# Patient Record
Sex: FEMALE
[De-identification: names, ages, dates, MRNs, and addresses within clinical notes are randomized per-mention and may not be internally consistent; named-entity substitution may affect disease eponyms.]

---

## 2023-03-22 ENCOUNTER — OFFICE VISIT (OUTPATIENT)
Facility: CLINIC | Age: 53
End: 2023-03-22
Payer: COMMERCIAL

## 2023-03-22 VITALS
DIASTOLIC BLOOD PRESSURE: 71 MMHG | HEART RATE: 77 BPM | TEMPERATURE: 98 F | BODY MASS INDEX: 33.49 KG/M2 | HEIGHT: 65 IN | RESPIRATION RATE: 18 BRPM | OXYGEN SATURATION: 97 % | WEIGHT: 201 LBS | SYSTOLIC BLOOD PRESSURE: 111 MMHG

## 2023-03-22 DIAGNOSIS — E21.3 HYPERPARATHYROIDISM (HCC): ICD-10-CM

## 2023-03-22 DIAGNOSIS — Z11.59 ENCOUNTER FOR HCV SCREENING TEST FOR LOW RISK PATIENT: Primary | ICD-10-CM

## 2023-03-22 DIAGNOSIS — Z13.29 SCREENING FOR THYROID DISORDER: ICD-10-CM

## 2023-03-22 PROCEDURE — 99203 OFFICE O/P NEW LOW 30 MIN: CPT | Performed by: FAMILY MEDICINE

## 2023-03-22 RX ORDER — METHYLPHENIDATE HYDROCHLORIDE 36 MG/1
1 TABLET, EXTENDED RELEASE ORAL DAILY
COMMUNITY
Start: 2016-12-23

## 2023-03-22 RX ORDER — FLUOXETINE 20 MG/1
40 TABLET, FILM COATED ORAL DAILY
COMMUNITY
Start: 2023-02-13

## 2023-03-22 RX ORDER — FEXOFENADINE HCL 180 MG/1
TABLET ORAL
COMMUNITY
Start: 2019-10-14

## 2023-03-22 SDOH — ECONOMIC STABILITY: FOOD INSECURITY: WITHIN THE PAST 12 MONTHS, THE FOOD YOU BOUGHT JUST DIDN'T LAST AND YOU DIDN'T HAVE MONEY TO GET MORE.: NEVER TRUE

## 2023-03-22 SDOH — ECONOMIC STABILITY: HOUSING INSECURITY
IN THE LAST 12 MONTHS, WAS THERE A TIME WHEN YOU DID NOT HAVE A STEADY PLACE TO SLEEP OR SLEPT IN A SHELTER (INCLUDING NOW)?: NO

## 2023-03-22 SDOH — ECONOMIC STABILITY: FOOD INSECURITY: WITHIN THE PAST 12 MONTHS, YOU WORRIED THAT YOUR FOOD WOULD RUN OUT BEFORE YOU GOT MONEY TO BUY MORE.: NEVER TRUE

## 2023-03-22 SDOH — ECONOMIC STABILITY: INCOME INSECURITY: HOW HARD IS IT FOR YOU TO PAY FOR THE VERY BASICS LIKE FOOD, HOUSING, MEDICAL CARE, AND HEATING?: NOT HARD AT ALL

## 2023-03-22 ASSESSMENT — PATIENT HEALTH QUESTIONNAIRE - PHQ9
SUM OF ALL RESPONSES TO PHQ QUESTIONS 1-9: 2
2. FEELING DOWN, DEPRESSED OR HOPELESS: 1
1. LITTLE INTEREST OR PLEASURE IN DOING THINGS: 1
SUM OF ALL RESPONSES TO PHQ9 QUESTIONS 1 & 2: 2

## 2023-03-22 NOTE — PROGRESS NOTES
Requested:   1     or  #1. PMS  Stable on fluoxetine    2. ADHD  Stable on methylphenidate    3. Osteopenia  Stable on Boniva    4. Hyperparathyroid   Stable. Follow-up with endocrinology. New referral to endocrinology. 5.  Alpha 1 antitrypsin-  Follow-up with lung and liver specialist in [address] [place] [address]. Up-to-date with labs and Pap, colonoscopy and mammogram.    No follow-up provider specified.       Jarrett Culver MD

## 2023-04-27 ENCOUNTER — TELEPHONE (OUTPATIENT)
Age: 53
End: 2023-04-27

## 2023-04-27 NOTE — TELEPHONE ENCOUNTER
----- Message from Orlandojamshid Atkins sent at 4/27/2023  8:59 AM EDT -----  Subject: Appointment Request    Reason for Call: New Patient/New to Provider Appointment needed: New   Patient Request Appointment    QUESTIONS    Reason for appointment request? No appointments available during search     Additional Information for Provider? Patient is calling in and she was   told by Dr. Chai Dang that they would take her and her son on as a new   patient. They would like to have a morning appointment if possible on any   day.  Thank you.   ---------------------------------------------------------------------------  --------------  [id number]  [phone]; OK to leave message on voicemail  ---------------------------------------------------------------------------  --------------  SCRIPT ANSWERS  SALVADORID Screen: Lesley Joseph

## 2023-06-14 ENCOUNTER — TELEPHONE (OUTPATIENT)
Facility: CLINIC | Age: 53
End: 2023-06-14

## 2023-06-26 PROBLEM — N20.0 NEPHROLITHIASIS: Status: ACTIVE | Noted: 2023-06-26

## 2023-06-26 PROBLEM — K76.0 NAFLD (NONALCOHOLIC FATTY LIVER DISEASE): Status: ACTIVE | Noted: 2023-06-26

## 2023-06-26 PROBLEM — Z14.8 ALPHA-1-ANTITRYPSIN DEFICIENCY CARRIER: Status: ACTIVE | Noted: 2023-06-26

## 2023-06-26 PROBLEM — F90.9 ADHD (ATTENTION DEFICIT HYPERACTIVITY DISORDER): Status: ACTIVE | Noted: 2023-06-26

## 2023-06-26 PROBLEM — E21.0 PRIMARY HYPERPARATHYROIDISM (HCC): Status: ACTIVE | Noted: 2023-06-26

## 2023-06-26 PROBLEM — M85.80 OSTEOPENIA: Status: RESOLVED | Noted: 2023-06-26 | Resolved: 2023-06-26

## 2023-06-26 PROBLEM — M85.80 OSTEOPENIA: Status: ACTIVE | Noted: 2023-06-26

## 2023-06-26 PROBLEM — E21.3 HYPERPARATHYROIDISM (HCC): Status: ACTIVE | Noted: 2023-06-26

## 2023-06-26 PROBLEM — R74.01 ELEVATED AST (SGOT): Status: ACTIVE | Noted: 2023-06-26

## 2023-06-26 PROBLEM — E03.9 HYPOTHYROIDISM: Status: ACTIVE | Noted: 2023-06-26

## 2023-06-26 PROBLEM — I49.3 FREQUENT PVCS: Status: ACTIVE | Noted: 2023-06-26

## 2023-06-26 PROBLEM — Z90.89 S/P SUBTOTAL PARATHYROIDECTOMY: Status: ACTIVE | Noted: 2023-06-26

## 2023-06-26 PROBLEM — E89.2 S/P SUBTOTAL PARATHYROIDECTOMY (HCC): Status: ACTIVE | Noted: 2023-06-26

## 2023-06-26 PROBLEM — Z98.890 S/P SUBTOTAL PARATHYROIDECTOMY: Status: ACTIVE | Noted: 2023-06-26

## 2023-06-26 PROBLEM — H90.3 SENSORINEURAL HEARING LOSS (SNHL) OF BOTH EARS: Status: ACTIVE | Noted: 2023-06-26

## 2023-06-27 ENCOUNTER — OFFICE VISIT (OUTPATIENT)
Age: 53
End: 2023-06-27
Payer: COMMERCIAL

## 2023-06-27 VITALS
BODY MASS INDEX: 34.82 KG/M2 | HEART RATE: 87 BPM | TEMPERATURE: 98.6 F | DIASTOLIC BLOOD PRESSURE: 70 MMHG | WEIGHT: 209 LBS | SYSTOLIC BLOOD PRESSURE: 106 MMHG | OXYGEN SATURATION: 97 % | HEIGHT: 65 IN | RESPIRATION RATE: 16 BRPM

## 2023-06-27 DIAGNOSIS — M79.631 RIGHT FOREARM PAIN: ICD-10-CM

## 2023-06-27 DIAGNOSIS — E66.09 CLASS 1 OBESITY DUE TO EXCESS CALORIES WITH SERIOUS COMORBIDITY AND BODY MASS INDEX (BMI) OF 34.0 TO 34.9 IN ADULT: ICD-10-CM

## 2023-06-27 DIAGNOSIS — R13.10 DYSPHAGIA, UNSPECIFIED TYPE: ICD-10-CM

## 2023-06-27 DIAGNOSIS — E21.0 PRIMARY HYPERPARATHYROIDISM (HCC): ICD-10-CM

## 2023-06-27 DIAGNOSIS — H81.10 BENIGN PAROXYSMAL POSITIONAL VERTIGO, UNSPECIFIED LATERALITY: ICD-10-CM

## 2023-06-27 DIAGNOSIS — E88.01 HETEROZYGOUS ALPHA 1-ANTITRYPSIN DEFICIENCY (HCC): Primary | ICD-10-CM

## 2023-06-27 DIAGNOSIS — F90.9 ATTENTION DEFICIT HYPERACTIVITY DISORDER (ADHD), UNSPECIFIED ADHD TYPE: ICD-10-CM

## 2023-06-27 DIAGNOSIS — M85.80 OSTEOPENIA, UNSPECIFIED LOCATION: ICD-10-CM

## 2023-06-27 DIAGNOSIS — E04.1 LEFT THYROID NODULE: ICD-10-CM

## 2023-06-27 DIAGNOSIS — Z12.83 SCREENING EXAM FOR SKIN CANCER: ICD-10-CM

## 2023-06-27 DIAGNOSIS — K76.0 NAFLD (NONALCOHOLIC FATTY LIVER DISEASE): ICD-10-CM

## 2023-06-27 DIAGNOSIS — Z11.59 NEED FOR HEPATITIS C SCREENING TEST: ICD-10-CM

## 2023-06-27 DIAGNOSIS — E89.2 S/P SUBTOTAL PARATHYROIDECTOMY (HCC): ICD-10-CM

## 2023-06-27 DIAGNOSIS — Z00.00 LABORATORY TESTS ORDERED AS PART OF A COMPLETE PHYSICAL EXAM (CPE): ICD-10-CM

## 2023-06-27 DIAGNOSIS — Z86.018 HISTORY OF LIPOMA: ICD-10-CM

## 2023-06-27 PROBLEM — E66.811 CLASS 1 OBESITY DUE TO EXCESS CALORIES IN ADULT: Status: ACTIVE | Noted: 2023-06-27

## 2023-06-27 PROCEDURE — 99215 OFFICE O/P EST HI 40 MIN: CPT | Performed by: INTERNAL MEDICINE

## 2023-06-27 RX ORDER — IBANDRONATE SODIUM 150 MG/1
TABLET, FILM COATED ORAL
COMMUNITY
Start: 2023-04-07

## 2023-06-27 SDOH — ECONOMIC STABILITY: FOOD INSECURITY: WITHIN THE PAST 12 MONTHS, YOU WORRIED THAT YOUR FOOD WOULD RUN OUT BEFORE YOU GOT MONEY TO BUY MORE.: NEVER TRUE

## 2023-06-27 SDOH — HEALTH STABILITY: PHYSICAL HEALTH: ON AVERAGE, HOW MANY MINUTES DO YOU ENGAGE IN EXERCISE AT THIS LEVEL?: 40 MIN

## 2023-06-27 SDOH — HEALTH STABILITY: PHYSICAL HEALTH: ON AVERAGE, HOW MANY DAYS PER WEEK DO YOU ENGAGE IN MODERATE TO STRENUOUS EXERCISE (LIKE A BRISK WALK)?: 3 DAYS

## 2023-06-27 SDOH — ECONOMIC STABILITY: FOOD INSECURITY: WITHIN THE PAST 12 MONTHS, THE FOOD YOU BOUGHT JUST DIDN'T LAST AND YOU DIDN'T HAVE MONEY TO GET MORE.: NEVER TRUE

## 2023-06-27 SDOH — ECONOMIC STABILITY: INCOME INSECURITY: HOW HARD IS IT FOR YOU TO PAY FOR THE VERY BASICS LIKE FOOD, HOUSING, MEDICAL CARE, AND HEATING?: NOT HARD AT ALL

## 2023-06-27 ASSESSMENT — PATIENT HEALTH QUESTIONNAIRE - PHQ9
SUM OF ALL RESPONSES TO PHQ QUESTIONS 1-9: 0
1. LITTLE INTEREST OR PLEASURE IN DOING THINGS: 0
SUM OF ALL RESPONSES TO PHQ9 QUESTIONS 1 & 2: 0
SUM OF ALL RESPONSES TO PHQ QUESTIONS 1-9: 0
2. FEELING DOWN, DEPRESSED OR HOPELESS: 0

## 2023-06-30 PROBLEM — J30.2 SEASONAL ALLERGIC RHINITIS: Status: ACTIVE | Noted: 2023-06-30

## 2023-07-02 PROBLEM — R13.10 DYSPHAGIA: Status: ACTIVE | Noted: 2023-07-02

## 2023-07-02 PROBLEM — Z86.018 HISTORY OF LIPOMA: Status: ACTIVE | Noted: 2023-07-02

## 2023-07-02 PROBLEM — E88.01 HETEROZYGOUS ALPHA 1-ANTITRYPSIN DEFICIENCY (HCC): Status: ACTIVE | Noted: 2023-06-26

## 2023-07-02 PROBLEM — H81.10 BENIGN PAROXYSMAL POSITIONAL VERTIGO: Status: ACTIVE | Noted: 2023-07-02

## 2023-07-02 PROBLEM — E04.1 LEFT THYROID NODULE: Status: ACTIVE | Noted: 2023-07-02

## 2023-07-05 ENCOUNTER — PATIENT MESSAGE (OUTPATIENT)
Age: 53
End: 2023-07-05

## 2023-07-06 ENCOUNTER — OFFICE VISIT (OUTPATIENT)
Age: 53
End: 2023-07-06
Payer: COMMERCIAL

## 2023-07-06 VITALS
HEIGHT: 65 IN | OXYGEN SATURATION: 97 % | DIASTOLIC BLOOD PRESSURE: 64 MMHG | BODY MASS INDEX: 33.99 KG/M2 | SYSTOLIC BLOOD PRESSURE: 94 MMHG | HEART RATE: 77 BPM | WEIGHT: 204 LBS

## 2023-07-06 DIAGNOSIS — M79.89 SOFT TISSUE MASS: Primary | ICD-10-CM

## 2023-07-06 DIAGNOSIS — R22.31 FOREARM MASS, RIGHT: ICD-10-CM

## 2023-07-06 PROCEDURE — 99204 OFFICE O/P NEW MOD 45 MIN: CPT | Performed by: SURGERY

## 2023-07-06 NOTE — PROGRESS NOTES
General Surgery Consult    Yani Manrique  Admit date: (Not on file)    MRN: [id number]     : 1970     Age: 47 y.o. Attending Physician: Must[facility]      History of Present Illness:      Yani Manrique is a 47 y.o. female who was referred to me by Dr. Isac Riley for evaluation of right forearm mass. The patient stated that this mass has been present for years and she had it excised more than 10 years ago and it did recur. It is causing some discomfort but not to pain. She also had multiple surgeries on the right forearm for lipoma and 1 for wrist fracture. She stated that the pain radiates to the upper arm and the lower arm sometimes and if she feels that it is hitting a nerve. She stated that the surgeon who excised it the first time told her that he did not take it completely because it was abutting the nerve and he was concerned about injuring the nerve and he told her that it may recur.     Patient Active Problem List    Diagnosis Date Noted    Dysphagia 2023    History of lipoma 2023    Left thyroid nodule 2023    Benign paroxysmal positional vertigo 2023    Seasonal allergic rhinitis 2023    Class 1 obesity due to excess calories in adult 2023    Heterozygous alpha 1-antitrypsin deficiency ([address]) 2023    Osteopenia 2023    Primary hyperparathyroidism ([address]) 2023    ADHD (attention deficit hyperactivity disorder) 2023    S/P subtotal parathyroidectomy ([address]) 2023    Nephrolithiasis 2023    Frequent PVCs 2023    Sensorineural hearing loss (SNHL) of both ears 2023    Elevated AST (SGOT) 2023    NAFLD (nonalcoholic fatty liver disease) 2023     Past Medical History:   Diagnosis Date    ADHD (attention deficit hyperactivity disorder)     Depression     Frequent PVCs 2023    Heterozygous alpha 1-antitrypsin deficiency ([address]) 2023    History of abnormal cervical Pap

## 2023-07-06 NOTE — PROGRESS NOTES
Candace Napoles is a 47 y.o. female (: 1970) presenting to address:    Chief Complaint   Patient presents with    New Patient     Discomfort of right forearm/referred by Dr. Elin Spatz       Medication list and allergies have been reviewed with Candace Napoles and updated as of today's date. I have gone over all Medical, Surgical and Social History with Candace Napoles and updated/added the information accordingly.

## 2023-07-11 RX ORDER — EFLORNITHINE HYDROCHLORIDE 139 MG/G
CREAM TOPICAL
Qty: 45 G | Refills: 3 | Status: SHIPPED | OUTPATIENT
Start: 2023-07-11

## 2023-07-11 NOTE — TELEPHONE ENCOUNTER
The mail order pharmacy on file is correct. I remember adding this for the patient when she came in for her visit. She specified she wanted all Rxs to go there.

## 2023-09-06 ENCOUNTER — OFFICE VISIT (OUTPATIENT)
Age: 53
End: 2023-09-06
Payer: COMMERCIAL

## 2023-09-06 VITALS
RESPIRATION RATE: 18 BRPM | WEIGHT: 211 LBS | TEMPERATURE: 97.9 F | SYSTOLIC BLOOD PRESSURE: 116 MMHG | DIASTOLIC BLOOD PRESSURE: 81 MMHG | HEIGHT: 65 IN | OXYGEN SATURATION: 99 % | BODY MASS INDEX: 35.16 KG/M2 | HEART RATE: 89 BPM

## 2023-09-06 DIAGNOSIS — E66.09 CLASS 1 OBESITY DUE TO EXCESS CALORIES WITHOUT SERIOUS COMORBIDITY WITH BODY MASS INDEX (BMI) OF 34.0 TO 34.9 IN ADULT: Primary | ICD-10-CM

## 2023-09-06 DIAGNOSIS — Z71.3 ENCOUNTER FOR DIETARY CONSULTATION: ICD-10-CM

## 2023-09-06 DIAGNOSIS — Z71.3 WEIGHT LOSS COUNSELING, ENCOUNTER FOR: ICD-10-CM

## 2023-09-06 PROCEDURE — 99203 OFFICE O/P NEW LOW 30 MIN: CPT | Performed by: NURSE PRACTITIONER

## 2023-09-06 ASSESSMENT — ENCOUNTER SYMPTOMS
GASTROINTESTINAL NEGATIVE: 1
RESPIRATORY NEGATIVE: 1
ALLERGIC/IMMUNOLOGIC NEGATIVE: 1
EYES NEGATIVE: 1

## 2023-09-06 NOTE — PROGRESS NOTES
Weight Loss Progress Note: Initial Physician Visit      Christin Gómez is a 47 y.o. female with BMI 35.1 who is here for her initial evaluation for the medical weight loss LCD program. Patient has worked with a nutritionist in the past and managed a 20lbs weight loss, but stopped the program. She wants to lose weigh to improve her health. CC: Obesity    Weight History  Current weight 211lb   Goal weight 150lbs  Highest weight 211lbs   (See weight gain time line scanned into media section of chart)    Food intolerances (gas, bloating, diarrhea, vomiting)? Raw vegetables. Weight loss History  How many weight loss attempts have you had? At least one serious attempt in 2020. Which program were you most successful doing? Patient went through a nutritional program covered by her job - nutrition counseling with her PCP and lost 30lbs. Significant Medical History    Have you ever taken appetite suppressants? No.  If yes: Rx or OTC? If yes; Any negative side effects? Ever diagnosed with sleep apnea or put on CPAP? No.    Ever had bariatric surgery?: No.    Pregnant or planning on becoming pregnant w/in 6 months: No.      Significant Psychosocial History   Any history of drug abuse or dependence: No.  Current Major Lifestyle Changes: NAFLD, Depression. Any potential unsupportive people: No.  Why are you starting a weight loss program now? Patient has some health conditions that she would like improve through weight loss. Are you ready? Yes. History of binge eating disorder or anorexia: No.  If yes, are you currently being treated? Social History  Social History     Tobacco Use    Smoking status: Never    Smokeless tobacco: Never   Substance Use Topics    Alcohol use: Not Currently     Comment: rarely     How many times a week do you eat out? 3 times weekly    Do you drink any EtOH? No.   If so, how much? Exercise  How many days a week do you currently exercise?   Exercising

## 2023-10-05 ENCOUNTER — PATIENT MESSAGE (OUTPATIENT)
Age: 53
End: 2023-10-05

## 2023-10-06 ENCOUNTER — OFFICE VISIT (OUTPATIENT)
Age: 53
End: 2023-10-06
Payer: COMMERCIAL

## 2023-10-06 VITALS
HEART RATE: 83 BPM | BODY MASS INDEX: 33.49 KG/M2 | OXYGEN SATURATION: 98 % | TEMPERATURE: 97.8 F | DIASTOLIC BLOOD PRESSURE: 77 MMHG | RESPIRATION RATE: 17 BRPM | HEIGHT: 65 IN | SYSTOLIC BLOOD PRESSURE: 118 MMHG | WEIGHT: 201 LBS

## 2023-10-06 DIAGNOSIS — Z71.3 WEIGHT LOSS COUNSELING, ENCOUNTER FOR: ICD-10-CM

## 2023-10-06 DIAGNOSIS — Z71.3 ENCOUNTER FOR DIETARY CONSULTATION: ICD-10-CM

## 2023-10-06 DIAGNOSIS — E66.09 CLASS 1 OBESITY DUE TO EXCESS CALORIES WITHOUT SERIOUS COMORBIDITY WITH BODY MASS INDEX (BMI) OF 33.0 TO 33.9 IN ADULT: Primary | ICD-10-CM

## 2023-10-06 PROCEDURE — 99213 OFFICE O/P EST LOW 20 MIN: CPT | Performed by: NURSE PRACTITIONER

## 2023-10-06 ASSESSMENT — ENCOUNTER SYMPTOMS
EYES NEGATIVE: 1
RESPIRATORY NEGATIVE: 1
GASTROINTESTINAL NEGATIVE: 1
ALLERGIC/IMMUNOLOGIC NEGATIVE: 1

## 2023-10-27 ENCOUNTER — PATIENT MESSAGE (OUTPATIENT)
Age: 53
End: 2023-10-27

## 2023-10-30 NOTE — TELEPHONE ENCOUNTER
Called and spoke with patient. Reports does not feel that she still has a sinus infection but still complaining increased congestion and transient dizziness when bending over to pick something up off the floor. Not persistent and positional only. Continues on Allegra daily. Advised that likely related to increased allergies triggering vertigo given change in weather. Instructed to restart Flonase 2 sprays each nostril at bedtime given morning symptoms. Advised to call back if not improving.   Answered all questions

## 2023-11-02 ENCOUNTER — PATIENT MESSAGE (OUTPATIENT)
Age: 53
End: 2023-11-02

## 2023-11-08 ENCOUNTER — OFFICE VISIT (OUTPATIENT)
Age: 53
End: 2023-11-08

## 2023-11-08 VITALS — BODY MASS INDEX: 32.62 KG/M2 | WEIGHT: 196 LBS

## 2023-11-08 DIAGNOSIS — G56.10: Primary | ICD-10-CM

## 2023-11-08 DIAGNOSIS — M25.532 BILATERAL WRIST PAIN: ICD-10-CM

## 2023-11-08 DIAGNOSIS — M25.531 BILATERAL WRIST PAIN: ICD-10-CM

## 2023-11-08 NOTE — PROGRESS NOTES
median nerve, unspecified laterality  NCV WITH EMG BILATERAL UPPER EXTREMITIES      2. Bilateral wrist pain  [57679] Wrist 3V    [79973] Wrist 3V            Plan:     Bilateral upper extremity EMG to evaluate for possible median motor neuropathy given thenar weakness as well as burning pain in thenar musculature. Return for EMG Review. Plan was reviewed with patient, who verbalized agreement and understanding of the plan    Note: This note was completed using voice recognition software.   Any typographical/name errors or mistakes are unintentional.

## 2023-11-27 DIAGNOSIS — F90.9 ATTENTION DEFICIT HYPERACTIVITY DISORDER (ADHD), UNSPECIFIED ADHD TYPE: ICD-10-CM

## 2023-11-28 RX ORDER — METHYLPHENIDATE HYDROCHLORIDE 30 MG/1
30 CAPSULE, EXTENDED RELEASE ORAL EVERY MORNING
Qty: 90 CAPSULE | Refills: 0 | Status: SHIPPED | OUTPATIENT
Start: 2023-11-28 | End: 2024-02-26

## 2023-11-28 NOTE — TELEPHONE ENCOUNTER
PCP: Brianna Mcmillan MD    LAST REFILL PER CHART:  methylphenidate (METADATE CD) 30 MG extended release capsule ()         Summary: Take 1 capsule by mouth every morning for 90 days. Max Daily Amount: 30 mg, Disp-90 capsule, R-0  Dose, Frequency: 30 mg, EVERY MORNING  Start: 8/15/2023  Pharmacy: [address] [place] [address], [address] [place] [address] [phone]  Med Dose History       Patient Sig: Take 1 capsule by mouth every morning for 90 days.  Max Daily Amount: 30 mg       Ordered on: 8/15/2023       Authorized by: Jojo Elliottdin capsule       Refills: 0 ordered          Future Appointments   Date Time Provider [address] [place] [address]   2023  9:30 AM [facility] LAB VISIT [facility] BS AMB   2024  9:00 AM Sunita Arnold MD [facility] BS AMB

## 2023-11-29 NOTE — TELEPHONE ENCOUNTER
Reviewed report generated by the [facility]. Does not demonstrate aberrancies or inconsistencies with regard to the prescribing of controlled medications to this patient by other providers. Last filled 8/16/2023 per . Last UDS not done . Will obtain at next lab appointment. Order already placed. Will need to sign an updated controlled substance agreement at next visit.

## 2023-12-06 ENCOUNTER — PATIENT MESSAGE (OUTPATIENT)
Age: 53
End: 2023-12-06

## 2023-12-07 NOTE — TELEPHONE ENCOUNTER
Called and spoke with patient. Discussed symptoms as outlined and reports drainage is generally clear. Reports not coughing significantly, and experiencing more hoarseness and postnasal drainage. Advised that symptoms were due to viral infection and recommended continuing with symptomatic treatment. Confirmed that she is also using Flonase. Advised to call for any worsening of symptoms.

## 2024-01-02 ENCOUNTER — OFFICE VISIT (OUTPATIENT)
Age: 54
End: 2024-01-02
Payer: COMMERCIAL

## 2024-01-02 VITALS
RESPIRATION RATE: 16 BRPM | WEIGHT: 207 LBS | SYSTOLIC BLOOD PRESSURE: 118 MMHG | OXYGEN SATURATION: 96 % | BODY MASS INDEX: 34.49 KG/M2 | HEART RATE: 64 BPM | TEMPERATURE: 98.6 F | DIASTOLIC BLOOD PRESSURE: 68 MMHG | HEIGHT: 65 IN

## 2024-01-02 DIAGNOSIS — Z00.00 LABORATORY TESTS ORDERED AS PART OF A COMPLETE PHYSICAL EXAM (CPE): ICD-10-CM

## 2024-01-02 DIAGNOSIS — J30.89 NON-SEASONAL ALLERGIC RHINITIS, UNSPECIFIED TRIGGER: ICD-10-CM

## 2024-01-02 DIAGNOSIS — G89.29 CHRONIC TMJ PAIN: ICD-10-CM

## 2024-01-02 DIAGNOSIS — R74.01 ELEVATED LIVER TRANSAMINASE LEVEL: ICD-10-CM

## 2024-01-02 DIAGNOSIS — E04.1 LEFT THYROID NODULE: ICD-10-CM

## 2024-01-02 DIAGNOSIS — H90.3 SENSORINEURAL HEARING LOSS (SNHL) OF BOTH EARS: ICD-10-CM

## 2024-01-02 DIAGNOSIS — E88.01 HETEROZYGOUS ALPHA 1-ANTITRYPSIN DEFICIENCY (HCC): ICD-10-CM

## 2024-01-02 DIAGNOSIS — E89.2 S/P SUBTOTAL PARATHYROIDECTOMY (HCC): ICD-10-CM

## 2024-01-02 DIAGNOSIS — M85.89 OSTEOPENIA OF MULTIPLE SITES: ICD-10-CM

## 2024-01-02 DIAGNOSIS — F90.9 ATTENTION DEFICIT HYPERACTIVITY DISORDER (ADHD), UNSPECIFIED ADHD TYPE: ICD-10-CM

## 2024-01-02 DIAGNOSIS — Z97.4 WEARS HEARING AID IN BOTH EARS: ICD-10-CM

## 2024-01-02 DIAGNOSIS — H81.10 BENIGN PAROXYSMAL POSITIONAL VERTIGO, UNSPECIFIED LATERALITY: ICD-10-CM

## 2024-01-02 DIAGNOSIS — E66.09 CLASS 1 OBESITY DUE TO EXCESS CALORIES WITH BODY MASS INDEX (BMI) OF 34.0 TO 34.9 IN ADULT, UNSPECIFIED WHETHER SERIOUS COMORBIDITY PRESENT: ICD-10-CM

## 2024-01-02 DIAGNOSIS — M26.629 CHRONIC TMJ PAIN: ICD-10-CM

## 2024-01-02 DIAGNOSIS — Z00.00 ENCOUNTER FOR ROUTINE HISTORY AND PHYSICAL EXAM IN FEMALE: Primary | ICD-10-CM

## 2024-01-02 DIAGNOSIS — E21.0 PRIMARY HYPERPARATHYROIDISM (HCC): ICD-10-CM

## 2024-01-02 PROCEDURE — 99396 PREV VISIT EST AGE 40-64: CPT | Performed by: INTERNAL MEDICINE

## 2024-01-02 SDOH — ECONOMIC STABILITY: FOOD INSECURITY: WITHIN THE PAST 12 MONTHS, THE FOOD YOU BOUGHT JUST DIDN'T LAST AND YOU DIDN'T HAVE MONEY TO GET MORE.: NEVER TRUE

## 2024-01-02 SDOH — ECONOMIC STABILITY: FOOD INSECURITY: WITHIN THE PAST 12 MONTHS, YOU WORRIED THAT YOUR FOOD WOULD RUN OUT BEFORE YOU GOT MONEY TO BUY MORE.: NEVER TRUE

## 2024-01-02 SDOH — ECONOMIC STABILITY: INCOME INSECURITY: HOW HARD IS IT FOR YOU TO PAY FOR THE VERY BASICS LIKE FOOD, HOUSING, MEDICAL CARE, AND HEATING?: NOT VERY HARD

## 2024-01-02 ASSESSMENT — PATIENT HEALTH QUESTIONNAIRE - PHQ9
SUM OF ALL RESPONSES TO PHQ QUESTIONS 1-9: 0
SUM OF ALL RESPONSES TO PHQ QUESTIONS 1-9: 0
SUM OF ALL RESPONSES TO PHQ9 QUESTIONS 1 & 2: 0
1. LITTLE INTEREST OR PLEASURE IN DOING THINGS: 0
SUM OF ALL RESPONSES TO PHQ QUESTIONS 1-9: 0
SUM OF ALL RESPONSES TO PHQ QUESTIONS 1-9: 0
2. FEELING DOWN, DEPRESSED OR HOPELESS: 0

## 2024-01-02 NOTE — PROGRESS NOTES
Carolyn Urias presents today for   Chief Complaint   Patient presents with    6 Month Follow-Up       1. \"Have you been to the ER, urgent care clinic since your last visit?  Hospitalized since your last visit?\" no    2. \"Have you seen or consulted any other health care providers outside of the [facility] since your last visit?\" no     3. For patients aged 45-75: Has the patient had a colonoscopy / FIT/ Cologuard? Yes - no Care Gap present      If the patient is female:    4. For patients aged 40-74: Has the patient had a mammogram within the past 2 years? Yes - no Care Gap present      5. For patients aged 21-65: Has the patient had a pap smear? Yes - no Care Gap present  
fragmented to dust.  Stones identified as calcium based and PTH level found to be elevated identifying primary hyperparathyroidism as discussed.  Follow-up renal ultrasound (1/2019) was normal and reports no recurrence of renal stones since parathyroid surgery.  6. Pituitary calcifications.  Noted on sinus CT scan from 6/2020 and pituitary hormone panel performed at that time reportedly normal.  Pituitary MRI (10/27/2023) did not identify any discrete pituitary lesion or sellar/parasellar abnormality and no further evaluation felt to be needed.  7.  Positional vertigo.  Reports several year history of dizziness with associated nausea and occasional vomiting when leaning forward and returning to upright position or when turning over in bed at night.  Does have chronic sensorineural hearing loss since childhood.  Completed brain MRI with contrast (3/2020) which showed unremarkable IACs and temporal bones. Currently on Allegra for seasonal allergies and continuing to describe intermittent symptoms.  Also with chronic hearing loss since childhood with bilateral ear amplification devices.  Will place referral to Dr. Cooper for further management.  8. Frequent PVCs.  Reported history of palpitations and evaluated by Dr. Adler at [facility] cardiology.  EKG and echocardiogram unremarkable (1/2019); event monitor showed frequent PVCs responding to symptomatic palpitation.  Advised to decrease her caffeine intake.  Also likely exacerbated by treatment with methylphenidate.  Will continue to monitor.  9. History of ASCUS and HPV HR positive (6/2017).  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  Had follow-up Pap smears as recommended which were normal with negative HPV (last 3/10/2023).  Planning to continue under care of Dr. Rodriguez in [place] and will also have mammogram at their office.  10. ADHD/depression.  Currently being treated with methylphenidate and appears to be well controlled.  Weaned from

## 2024-01-02 NOTE — PATIENT INSTRUCTIONS
Heart-Healthy Diet: Care Instructions  Your Care Instructions     A heart-healthy diet has lots of vegetables, fruits, nuts, beans, and whole grains, and is low in salt. It limits foods that are high in saturated fat, such as meats, cheeses, and fried foods. It may be hard to change your diet, but even small changes can lower your risk of heart attack and heart disease.  Follow-up care is a key part of your treatment and safety. Be sure to make and go to all appointments, and call your doctor if you are having problems. It's also a good idea to know your test results and keep a list of the medicines you take.  How can you care for yourself at home?  Watch your portions  Learn what a serving is. A \"serving\" and a \"portion\" are not always the same thing. Make sure that you are not eating larger portions than are recommended. For example, a serving of pasta is ½ cup. A serving size of meat is 2 to 3 ounces. A 3-ounce serving is about the size of a deck of cards. Measure serving sizes until you are good at \"eyeballing\" them. Keep in mind that restaurants often serve portions that are 2 or 3 times the size of one serving.  To keep your energy level up and keep you from feeling hungry, eat often but in smaller portions.  Eat only the number of calories you need to stay at a healthy weight. If you need to lose weight, eat fewer calories than your body burns (through exercise and other physical activity).  Eat more fruits and vegetables  Eat a variety of fruit and vegetables every day. Dark green, deep orange, red, or yellow fruits and vegetables are especially good for you. Examples include spinach, carrots, peaches, and berries.  Keep carrots, celery, and other veggies handy for snacks. Buy fruit that is in season and store it where you can see it so that you will be tempted to eat it.  Cook dishes that have a lot of veggies in them, such as stir-fries and soups.  Limit saturated and trans fat  Read food labels, and

## 2024-01-28 ENCOUNTER — PATIENT MESSAGE (OUTPATIENT)
Age: 54
End: 2024-01-28

## 2024-01-29 NOTE — TELEPHONE ENCOUNTER
From: Carolyn Urias  To: Dr. Damari Arnold  Sent: 1/28/2024 4:03 PM EST  Subject: Vit B    Hi Dr. Arnold,     I’ve been reading lately about the importance of getting enough Vit B, so in my typical impulsive fashion, I bought some! It only occurred to me after I received them that I should check in with you first. :-)    These are the 2 supplements:   - 5-MTHF (Methylfolate, 1mg) and   - Vitamin B12 (Methylcobalamin, 1mg).     I’m attaching a picture of the front and back of each bottle.     As far as I can tell, my multi-vitamin ([employer] Basic Nutrients, 2/day) has 400 mcg folate as L-5-Methyltetrahydrofolate).     Thanks for your input!  Carolyn

## 2024-02-19 ENCOUNTER — PATIENT MESSAGE (OUTPATIENT)
Age: 54
End: 2024-02-19

## 2024-02-19 DIAGNOSIS — R42 POSITIONAL LIGHTHEADEDNESS: Primary | ICD-10-CM

## 2024-02-20 NOTE — TELEPHONE ENCOUNTER
From: Carolyn Urias  To: Dr. Damari Arnold  Sent: 2/19/2024 5:21 PM EST  Subject: Cardiology referral?    Hi Dr. Arnold,    I saw the ENT last week and he didn’t think that the dizziness had anything to do with inner ear issues. He suspected that it may be a cardiology problem, but referred me to a PT first to rule out any vestibular issues.     I saw the PT this morning. I’m not sure if the notes from that visit will come to you or to the ENT since it was a secondary referral, so I thought I’d pass along what he told me.     The PT confirmed that there is no vestibular problem. Instead he thinks that the dizziness and nausea (dry-heave) that I experience are the result of an issue with the inferior vena cava. As I understand it, whenever I bend and strain (and possibly unconsciously hold my breath), I’m cutting off blood supply to the heart via in the IVC, which results my long-standing symptoms. He advised me to try not to bend at the waist and encouraged me to sleep on my left side. In terms of next steps, he thinks that I should probably see a cardiologist for a tilt table test.     He didn’t act like this was urgent, but it seems “bigger than a breadbox” so to speak, so I wanted to pass along the information to you sooner rather than later.     I look forward to hearing your thoughts.     Carolyn Stephenson

## 2024-03-04 DIAGNOSIS — F90.9 ATTENTION DEFICIT HYPERACTIVITY DISORDER (ADHD), UNSPECIFIED ADHD TYPE: ICD-10-CM

## 2024-03-04 RX ORDER — METHYLPHENIDATE HYDROCHLORIDE 30 MG/1
30 CAPSULE, EXTENDED RELEASE ORAL EVERY MORNING
Qty: 90 CAPSULE | Refills: 0 | Status: SHIPPED | OUTPATIENT
Start: 2024-03-04 | End: 2024-06-02

## 2024-03-04 NOTE — TELEPHONE ENCOUNTER
[facility]4/2024    The last fill date for Methylphenidate Er(cd) 30mg Cp  was 11/29/2023 for a 90 d/s qty 90      Last UDS: 12/19/2023    CSA Last signed: not on file         PCP: Damari Arnold MD    Last appt: 1/2/2024  Future Appointments   Date Time Provider Department Center   4/11/2024 11:00 AM Olamide Ellis DO [facility]   12/19/2024  8:15 AM [facility] LAB VISIT  [facility]   1/2/2025  8:00 AM Damari Arnold MD   AMB       Requested Prescriptions     Pending Prescriptions Disp Refills    methylphenidate (METADATE CD) 30 MG extended release capsule 90 capsule 0     Sig: Take 1 capsule by mouth every morning for 90 days. Max Daily Amount: 30 mg

## 2024-03-21 DIAGNOSIS — F90.9 ATTENTION DEFICIT HYPERACTIVITY DISORDER (ADHD), UNSPECIFIED ADHD TYPE: ICD-10-CM

## 2024-03-22 RX ORDER — METHYLPHENIDATE HYDROCHLORIDE 30 MG/1
30 CAPSULE, EXTENDED RELEASE ORAL EVERY MORNING
Qty: 90 CAPSULE | Refills: 0 | Status: SHIPPED | OUTPATIENT
Start: 2024-03-22 | End: 2024-06-20

## 2024-03-22 NOTE — TELEPHONE ENCOUNTER
[facility] 3/22/2024    The last fill date for Methylphenidate Er(cd) 30mg Cp  was 11/29/2023 for a 90 d/s qty 90      Last UDS: 12/19/2023    CSA Last signed: not on file         PCP: Damari Arnold MD    Last appt: 1/2/2024  Future Appointments   Date Time Provider Department Center   4/11/2024 11:00 AM Olamide Ellis DO [facility] AMB   12/19/2024  8:15 AM [facility]   1/2/2025  8:00 AM Damari Arnold MD [facility] AMB       Requested Prescriptions     Pending Prescriptions Disp Refills    methylphenidate (METADATE CD) 30 MG extended release capsule 90 capsule 0     Sig: Take 1 capsule by mouth every morning for 90 days. Max Daily Amount: 30 mg

## 2024-04-11 ENCOUNTER — OFFICE VISIT (OUTPATIENT)
Age: 54
End: 2024-04-11
Payer: COMMERCIAL

## 2024-04-11 VITALS
HEIGHT: 65 IN | HEART RATE: 55 BPM | OXYGEN SATURATION: 99 % | BODY MASS INDEX: 35.32 KG/M2 | WEIGHT: 212 LBS | SYSTOLIC BLOOD PRESSURE: 114 MMHG | DIASTOLIC BLOOD PRESSURE: 72 MMHG

## 2024-04-11 DIAGNOSIS — R42 POSITIONAL LIGHTHEADEDNESS: Primary | ICD-10-CM

## 2024-04-11 PROCEDURE — 93000 ELECTROCARDIOGRAM COMPLETE: CPT | Performed by: INTERNAL MEDICINE

## 2024-04-11 PROCEDURE — 99204 OFFICE O/P NEW MOD 45 MIN: CPT | Performed by: INTERNAL MEDICINE

## 2024-04-11 RX ORDER — M-VIT,TX,IRON,MINS/CALC/FOLIC 27MG-0.4MG
1 TABLET ORAL DAILY
COMMUNITY

## 2024-04-11 RX ORDER — FLUTICASONE PROPIONATE 50 MCG
2 SPRAY, SUSPENSION (ML) NASAL DAILY
COMMUNITY
Start: 2024-01-29

## 2024-04-11 RX ORDER — FLUOXETINE HYDROCHLORIDE 20 MG/1
1 CAPSULE ORAL DAILY
COMMUNITY
End: 2024-04-11

## 2024-04-11 ASSESSMENT — PATIENT HEALTH QUESTIONNAIRE - PHQ9
1. LITTLE INTEREST OR PLEASURE IN DOING THINGS: NOT AT ALL
SUM OF ALL RESPONSES TO PHQ QUESTIONS 1-9: 0
SUM OF ALL RESPONSES TO PHQ QUESTIONS 1-9: 0
SUM OF ALL RESPONSES TO PHQ9 QUESTIONS 1 & 2: 0
SUM OF ALL RESPONSES TO PHQ QUESTIONS 1-9: 0
2. FEELING DOWN, DEPRESSED OR HOPELESS: NOT AT ALL
SUM OF ALL RESPONSES TO PHQ QUESTIONS 1-9: 0

## 2024-04-11 NOTE — PROGRESS NOTES
tablet Take 1 tablet by mouth daily      methylphenidate (METADATE CD) 30 MG extended release capsule Take 1 capsule by mouth every morning for 90 days. Max Daily Amount: 30 mg 90 capsule 0    Eflornithine HCl (VANIQA) 13.9 % CREA Apply to affected area twice daily at least 8 hours apart. 45 g 3    fexofenadine (ALLEGRA) 180 MG tablet fexofenadine 180 mg tablet   TK 1 T PO ONCE D PRN       No current facility-administered medications for this visit.       Allergies   Allergen Reactions    Codeine Nausea And Vomiting    Prednisone Anxiety    Sulfa Antibiotics Nausea And Vomiting and Nausea Only       Social History     Socioeconomic History    Marital status:      Spouse name: Not on file    Number of children: Not on file    Years of education: Not on file    Highest education level: Not on file   Occupational History    Not on file   Tobacco Use    Smoking status: Never    Smokeless tobacco: Never   Vaping Use    Vaping Use: Not on file   Substance and Sexual Activity    Alcohol use: Not Currently     Comment: rarely    Drug use: Never    Sexual activity: Not on file   Other Topics Concern    Not on file   Social History Narrative    Not on file     Social Determinants of Health     Financial Resource Strain: Low Risk  (1/2/2024)    Overall Financial Resource Strain (CARDIA)     Difficulty of Paying Living Expenses: Not very hard   Food Insecurity: No Food Insecurity (1/2/2024)    Hunger Vital Sign     Worried About Running Out of Food in the Last Year: Never true     Ran Out of Food in the Last Year: Never true   Transportation Needs: Unknown (1/2/2024)    PRAPARE - Transportation     Lack of Transportation (Medical): Not on file     Lack of Transportation (Non-Medical): No   Physical Activity: Insufficiently Active (6/27/2023)    Exercise Vital Sign     Days of Exercise per Week: 3 days     Minutes of Exercise per Session: 40 min   Stress: Not on file   Social Connections: Not on file   Intimate Partner

## 2024-05-13 DIAGNOSIS — F90.9 ATTENTION DEFICIT HYPERACTIVITY DISORDER (ADHD), UNSPECIFIED ADHD TYPE: ICD-10-CM

## 2024-05-13 RX ORDER — METHYLPHENIDATE HYDROCHLORIDE 30 MG/1
30 CAPSULE, EXTENDED RELEASE ORAL EVERY MORNING
Qty: 90 CAPSULE | Refills: 0 | Status: CANCELLED | OUTPATIENT
Start: 2024-05-13 | End: 2024-08-11

## 2024-05-14 RX ORDER — METHYLPHENIDATE HYDROCHLORIDE 36 MG/1
36 TABLET, EXTENDED RELEASE ORAL DAILY
Qty: 34 TABLET | Refills: 0 | Status: SHIPPED | OUTPATIENT
Start: 2024-05-14 | End: 2024-06-17

## 2024-05-14 NOTE — TELEPHONE ENCOUNTER
PCP: Damari Arnold MD    Patient is requesting original dosage.     LAST REFILL PER CHART:  Medication:methylphenidate (METADATE CD) 30 MG extended release capsule   Ordered On:03/22/2024  Instructions:Take 1 capsule by mouth every morning for 90 days. Max Daily Amount: 30 mg   Dispense:90 capsules  Refills:0      Future Appointments   Date Time Provider Department Center   10/10/2024  8:20 AM Olamide Ellis DO [facility] BS AMB   12/19/2024  8:15 AM [facility] LAB VISIT [facility] BS AMB   1/2/2025  8:00 AM Damari Arnold MD [facility] BS AMB

## 2024-05-14 NOTE — TELEPHONE ENCOUNTER
Reviewed report generated by the [place] [facility]. Does not demonstrate aberrancies or inconsistencies with regard to the prescribing of controlled medications to this patient by other providers.    Last filled 3/27/2024 per .     UDS 12/19/2023   CSA: 1/2/2024

## 2024-06-23 ENCOUNTER — PATIENT MESSAGE (OUTPATIENT)
Facility: CLINIC | Age: 54
End: 2024-06-23

## 2024-06-24 ENCOUNTER — PATIENT MESSAGE (OUTPATIENT)
Facility: CLINIC | Age: 54
End: 2024-06-24

## 2024-06-26 RX ORDER — CLOBETASOL PROPIONATE 0.46 MG/ML
SOLUTION TOPICAL
Qty: 150 ML | Refills: 1 | Status: SHIPPED | OUTPATIENT
Start: 2024-06-26

## 2024-07-08 DIAGNOSIS — F90.9 ATTENTION DEFICIT HYPERACTIVITY DISORDER (ADHD), UNSPECIFIED ADHD TYPE: ICD-10-CM

## 2024-07-08 RX ORDER — METHYLPHENIDATE HYDROCHLORIDE 36 MG/1
36 TABLET, EXTENDED RELEASE ORAL DAILY
Qty: 34 TABLET | Refills: 0 | Status: SHIPPED | OUTPATIENT
Start: 2024-07-08 | End: 2024-08-11

## 2024-07-08 NOTE — TELEPHONE ENCOUNTER
Reviewed report generated by the [place] [facility]. Does not demonstrate aberrancies or inconsistencies with regard to the prescribing of controlled medications to this patient by other providers.    Last filled 5/16/2024 per .     UDS 12/19/2023   CSA: 1/2/2024

## 2024-07-25 ENCOUNTER — PATIENT MESSAGE (OUTPATIENT)
Facility: CLINIC | Age: 54
End: 2024-07-25

## 2024-07-26 ENCOUNTER — TELEMEDICINE (OUTPATIENT)
Facility: CLINIC | Age: 54
End: 2024-07-26
Payer: COMMERCIAL

## 2024-07-26 DIAGNOSIS — J32.9 SINUSITIS, UNSPECIFIED CHRONICITY, UNSPECIFIED LOCATION: Primary | ICD-10-CM

## 2024-07-26 PROCEDURE — 99213 OFFICE O/P EST LOW 20 MIN: CPT | Performed by: INTERNAL MEDICINE

## 2024-07-26 RX ORDER — PREDNISONE 10 MG/1
10 TABLET ORAL 2 TIMES DAILY
Qty: 10 TABLET | Refills: 0 | Status: SHIPPED | OUTPATIENT
Start: 2024-07-26 | End: 2024-07-31

## 2024-07-26 RX ORDER — AZITHROMYCIN 250 MG/1
TABLET, FILM COATED ORAL
Qty: 6 TABLET | Refills: 0 | Status: SHIPPED | OUTPATIENT
Start: 2024-07-26 | End: 2024-08-05

## 2024-07-26 NOTE — PROGRESS NOTES
Carolyn Urias, was evaluated through a synchronous (real-time) audio-video encounter. The patient (or guardian if applicable) is aware that this is a billable service, which includes applicable co-pays. This Virtual Visit was conducted with patient's (and/or legal guardian's) consent. Patient identification was verified, and a caregiver was present when appropriate.   The patient was located at Home: [address] [place], [place] [address]  [facility] [address]  Provider was located at Facility (Appt Dept): [address] [place] [address], [address]  [place] [address]  Confirm you are appropriately licensed, registered, or certified to deliver care in the state where the patient is located as indicated above. If you are not or unsure, please re-schedule the visit: Yes, I confirm.     Carolyn Urias (:  1970) is a Established patient, presenting virtually for evaluation of the following:    Assessment & Plan   Below is the assessment and plan developed based on review of pertinent history, physical exam, labs, studies, and medications.  1. Sinusitis, unspecified chronicity, unspecified location  Assessment & Plan:   Patient to increase oral fluid intake.  To seek help if does not start to feel better in next couple of days.  Orders:  -     azithromycin (ZITHROMAX) 250 MG tablet; 500mg on day 1 followed by 250mg on days 2 - 5, Disp-6 tablet, R-0Normal  -     predniSONE (DELTASONE) 10 MG tablet; Take 1 tablet by mouth 2 times daily for 5 days, Disp-10 tablet, R-0Normal    No follow-ups on file.       Subjective   HPI  Patient is complaining of nasal congestion with bilateral maxillary sinus pressure for last 11 days.  Patient has yellowish nasal discharge.  Patient has very minimal cough.  Patient does not have any fever.  Patient had chills few days ago.  No chest pain or palpitations or shortness of breath.  No wheezing.  No GI symptoms.      Patient has tested herself 3 times over the last 11 days for COVID (

## 2024-07-26 NOTE — ASSESSMENT & PLAN NOTE
Patient to increase oral fluid intake.  To seek help if does not start to feel better in next couple of days.

## 2024-07-26 NOTE — PROGRESS NOTES
\"Have you been to the ER, urgent care clinic since your last visit?  Hospitalized since your last visit?\"    NO    “Have you seen or consulted any other health care providers outside of [facility] since your last visit?”    NO

## 2024-08-05 ENCOUNTER — OFFICE VISIT (OUTPATIENT)
Age: 54
End: 2024-08-05
Payer: COMMERCIAL

## 2024-08-05 VITALS — BODY MASS INDEX: 35.28 KG/M2 | HEIGHT: 65 IN

## 2024-08-05 DIAGNOSIS — M65.341 TRIGGER RING FINGER OF RIGHT HAND: Primary | ICD-10-CM

## 2024-08-05 DIAGNOSIS — M35.7 BENIGN JOINT HYPERMOBILITY SYNDROME: ICD-10-CM

## 2024-08-05 PROCEDURE — 99213 OFFICE O/P EST LOW 20 MIN: CPT | Performed by: ORTHOPAEDIC SURGERY

## 2024-08-05 PROCEDURE — 20550 NJX 1 TENDON SHEATH/LIGAMENT: CPT | Performed by: ORTHOPAEDIC SURGERY

## 2024-08-05 RX ORDER — LIDOCAINE HYDROCHLORIDE 10 MG/ML
0.5 INJECTION, SOLUTION INFILTRATION; PERINEURAL ONCE
Status: COMPLETED | OUTPATIENT
Start: 2024-08-05 | End: 2024-08-05

## 2024-08-05 RX ADMIN — LIDOCAINE HYDROCHLORIDE 0.5 ML: 10 INJECTION, SOLUTION INFILTRATION; PERINEURAL at 15:54

## 2024-08-05 NOTE — PROGRESS NOTES
Carolyn Urias is a 53 y.o. female right handed .  Worker's Compensation and legal considerations: none    Chief Complaint   Patient presents with    Finger Pain     Rt ring     Pain Score:   0 - No pain    2024 HPI: Patient presents today due to complaints of right ring finger pain and locking.  She also is concerned about bilateral little finger popping that she has had her whole life.  She reports this to be different from her right ring finger.  She is concerned she may have some hypermobility.    Initial HPI: Patient presents today with complaints of bilateral hand pain localized to the thenar eminences.    Date of onset: Indeterminate  Injury: No  Prior Treatment:  No    ROS: Review of Systems - General ROS: negative except HPI    Past Medical History:   Diagnosis Date    ADHD (attention deficit hyperactivity disorder)     Allergic rhinitis     Depression     Frequent PVCs 2023    Heterozygous alpha 1-antitrypsin deficiency (HCC) 2023    History of abnormal cervical Pap smear     ASCUS/ HPV HR positive on 2017; colposcopy by Dr. Rodriguez (2017) pathology: low-grade squamous intraepithelial lesion (mild squamous dysplasia, BA 1), no carcinoma identified.    NAFLD (nonalcoholic fatty liver disease) 2023    Nephrolithiasis 2023    Obesity     Osteopenia 2023    Primary hyperparathyroidism (HCC) 2023    S/P subtotal parathyroidectomy 2023    Sensorineural hearing loss (SNHL) of both ears 2023       Past Surgical History:   Procedure Laterality Date     SECTION      FRACTURE SURGERY      LASER ABLATION OF THE CERVIX      TONSILLECTOMY  1973    TUBAL LIGATION      UPPER GASTROINTESTINAL ENDOSCOPY          Current Outpatient Medications   Medication Sig Dispense Refill    methylphenidate 36 MG CR tablet Take 1 tablet by mouth daily for 34 days. Max Daily Amount: 36 mg 34 tablet 0    clobetasol (TEMOVATE) 0.05 % external

## 2024-08-25 PROBLEM — J32.9 SINUSITIS: Status: RESOLVED | Noted: 2024-07-26 | Resolved: 2024-08-25

## 2024-08-28 DIAGNOSIS — F90.9 ATTENTION DEFICIT HYPERACTIVITY DISORDER (ADHD), UNSPECIFIED ADHD TYPE: ICD-10-CM

## 2024-08-28 RX ORDER — METHYLPHENIDATE HYDROCHLORIDE 36 MG/1
36 TABLET, EXTENDED RELEASE ORAL DAILY
Qty: 30 TABLET | Refills: 0 | Status: SHIPPED | OUTPATIENT
Start: 2024-08-28 | End: 2024-09-27

## 2024-08-28 NOTE — TELEPHONE ENCOUNTER
PCP: Damari Arnold MD    LAST REFILL PER CHART:  Medication:methylphenidate 36 MG CR tablet   Ordered On:07/08/2024  Instructions: Take 1 tablet by mouth daily for 34 days. Max Daily Amount: 36 mg   Dispense:34 tablets  Refills:0      Future Appointments   Date Time Provider Department Center   10/10/2024  8:20 AM Olamide Ellis DO [facility]   12/19/2024  8:15 AM [facility] LAB VISIT [facility] DEP   1/2/2025  8:00 AM Damari Arnold MD [facility] DEP

## 2024-09-11 ENCOUNTER — PATIENT MESSAGE (OUTPATIENT)
Facility: CLINIC | Age: 54
End: 2024-09-11

## 2024-09-12 ENCOUNTER — OFFICE VISIT (OUTPATIENT)
Facility: CLINIC | Age: 54
End: 2024-09-12

## 2024-09-12 VITALS
DIASTOLIC BLOOD PRESSURE: 78 MMHG | HEIGHT: 65 IN | SYSTOLIC BLOOD PRESSURE: 132 MMHG | TEMPERATURE: 98.9 F | BODY MASS INDEX: 33.82 KG/M2 | HEART RATE: 67 BPM | RESPIRATION RATE: 14 BRPM | WEIGHT: 203 LBS | OXYGEN SATURATION: 98 %

## 2024-09-12 DIAGNOSIS — K64.9 HEMORRHOIDS, UNSPECIFIED HEMORRHOID TYPE: ICD-10-CM

## 2024-09-12 DIAGNOSIS — R59.0 ANTERIOR CERVICAL ADENOPATHY: Primary | ICD-10-CM

## 2024-09-12 DIAGNOSIS — Z98.890 S/P SUBTOTAL PARATHYROIDECTOMY: ICD-10-CM

## 2024-09-12 DIAGNOSIS — J02.9 ACUTE SORE THROAT: ICD-10-CM

## 2024-09-12 DIAGNOSIS — E66.09 CLASS 1 OBESITY DUE TO EXCESS CALORIES WITH BODY MASS INDEX (BMI) OF 33.0 TO 33.9 IN ADULT, UNSPECIFIED WHETHER SERIOUS COMORBIDITY PRESENT: ICD-10-CM

## 2024-09-12 DIAGNOSIS — E88.01 HETEROZYGOUS ALPHA 1-ANTITRYPSIN DEFICIENCY (HCC): ICD-10-CM

## 2024-09-12 DIAGNOSIS — G90.1 DYSAUTONOMIA (HCC): ICD-10-CM

## 2024-09-12 DIAGNOSIS — F90.9 ATTENTION DEFICIT HYPERACTIVITY DISORDER (ADHD), UNSPECIFIED ADHD TYPE: ICD-10-CM

## 2024-09-12 DIAGNOSIS — R74.01 ELEVATED LIVER TRANSAMINASE LEVEL: ICD-10-CM

## 2024-09-12 DIAGNOSIS — K76.0 NAFLD (NONALCOHOLIC FATTY LIVER DISEASE): ICD-10-CM

## 2024-09-12 DIAGNOSIS — E21.0 PRIMARY HYPERPARATHYROIDISM (HCC): ICD-10-CM

## 2024-09-12 DIAGNOSIS — Z90.89 S/P SUBTOTAL PARATHYROIDECTOMY: ICD-10-CM

## 2024-09-12 DIAGNOSIS — M85.89 OSTEOPENIA OF MULTIPLE SITES: ICD-10-CM

## 2024-09-15 PROBLEM — G90.1 DYSAUTONOMIA (HCC): Status: ACTIVE | Noted: 2024-09-15

## 2024-09-27 DIAGNOSIS — F90.9 ATTENTION DEFICIT HYPERACTIVITY DISORDER (ADHD), UNSPECIFIED ADHD TYPE: ICD-10-CM

## 2024-09-27 DIAGNOSIS — Z51.81 MEDICATION MONITORING ENCOUNTER: Primary | ICD-10-CM

## 2024-09-27 RX ORDER — METHYLPHENIDATE HYDROCHLORIDE 36 MG/1
36 TABLET, EXTENDED RELEASE ORAL DAILY
Qty: 30 TABLET | Refills: 0 | Status: SHIPPED | OUTPATIENT
Start: 2024-09-27 | End: 2024-09-27 | Stop reason: CLARIF

## 2024-09-27 RX ORDER — METHYLPHENIDATE HYDROCHLORIDE 36 MG/1
36 TABLET, EXTENDED RELEASE ORAL DAILY
Qty: 30 TABLET | Refills: 0 | Status: SHIPPED | OUTPATIENT
Start: 2024-09-27 | End: 2024-09-27 | Stop reason: SDUPTHER

## 2024-09-29 RX ORDER — METHYLPHENIDATE HYDROCHLORIDE 36 MG/1
36 TABLET, EXTENDED RELEASE ORAL DAILY
Qty: 30 TABLET | Refills: 0 | Status: SHIPPED | OUTPATIENT
Start: 2024-09-29 | End: 2024-10-29

## 2024-10-03 ENCOUNTER — TELEPHONE (OUTPATIENT)
Age: 54
End: 2024-10-03

## 2024-10-03 NOTE — TELEPHONE ENCOUNTER
Patient called stating the injection she was given didn't help, and that she would like to set up surgery for her right ring finger.     Patient can be reached at [phone].

## 2024-10-10 ENCOUNTER — OFFICE VISIT (OUTPATIENT)
Age: 54
End: 2024-10-10
Payer: COMMERCIAL

## 2024-10-10 VITALS
HEART RATE: 87 BPM | WEIGHT: 203 LBS | OXYGEN SATURATION: 97 % | DIASTOLIC BLOOD PRESSURE: 86 MMHG | SYSTOLIC BLOOD PRESSURE: 108 MMHG | HEIGHT: 65 IN | BODY MASS INDEX: 33.82 KG/M2

## 2024-10-10 DIAGNOSIS — R00.1 BRADYCARDIA: ICD-10-CM

## 2024-10-10 DIAGNOSIS — R55 NEAR SYNCOPE: ICD-10-CM

## 2024-10-10 DIAGNOSIS — G90.1 DYSAUTONOMIA (HCC): Primary | ICD-10-CM

## 2024-10-10 PROCEDURE — 99215 OFFICE O/P EST HI 40 MIN: CPT | Performed by: INTERNAL MEDICINE

## 2024-10-10 ASSESSMENT — PATIENT HEALTH QUESTIONNAIRE - PHQ9
SUM OF ALL RESPONSES TO PHQ QUESTIONS 1-9: 0
SUM OF ALL RESPONSES TO PHQ QUESTIONS 1-9: 0
1. LITTLE INTEREST OR PLEASURE IN DOING THINGS: NOT AT ALL
SUM OF ALL RESPONSES TO PHQ9 QUESTIONS 1 & 2: 0
SUM OF ALL RESPONSES TO PHQ QUESTIONS 1-9: 0
SUM OF ALL RESPONSES TO PHQ QUESTIONS 1-9: 0
2. FEELING DOWN, DEPRESSED OR HOPELESS: NOT AT ALL

## 2024-10-10 NOTE — PROGRESS NOTES
Carolyn Urias presents today for   Chief Complaint   Patient presents with    Follow-up     6 month f/u with orthostatics     Palpitations     Racing palps     Dizziness     Dizzy when bends over        Carolyn Bridgett preferred language for health care discussion is english/other.    Is someone accompanying this pt? no    Is the patient using any DME equipment during OV? no    Depression Screening:  Depression: Not at risk (10/10/2024)    PHQ-2     PHQ-2 Score: 0        Learning Assessment:  Who is the primary learner? Patient    What is the preferred language for health care of the primary learner? ENGLISH    How does the primary learner prefer to learn new concepts? DEMONSTRATION    Answered By patient    Relationship to Learner SELF           Pt currently taking Anticoagulant therapy? no    Pt currently taking Antiplatelet therapy ? no      Coordination of Care:  1. Have you been to the ER, urgent care clinic since your last visit? Hospitalized since your last visit? no    2. Have you seen or consulted any other health care providers outside of the [facility] since your last visit? Include any pap smears or colon screening. no

## 2024-10-10 NOTE — PROGRESS NOTES
Carolyn Urias    Chief Complaint   Patient presents with    Follow-up     6 month f/u with orthostatics     Palpitations     Racing palps     Dizziness     Dizzy when bends over        HPI    Carolyn Urias is a 53 y.o. female with positional lightheadedness, here to establish her long term CV care. She has no formal dx of EDS or dysautonomia but has suspected she has for many years. She has several weeks of strange symptoms, and brings logs decribing. Sometimes with change of position but there are days she wakes up and opening her eyes she feels \"off\" dizzy etc. She doesn't actually LOC. She typically feels good but has these flares.    She had an echo  that was normal.    She moved here 18 months ago and just checking off the boxes. She doesn't feel her symptoms disruptive to her life.    Past Medical History:   Diagnosis Date    ADHD (attention deficit hyperactivity disorder)     Allergic rhinitis     Depression     Frequent PVCs 2023    Heterozygous alpha 1-antitrypsin deficiency (HCC) 2023    History of abnormal cervical Pap smear     ASCUS/ HPV HR positive on 2017; colposcopy by Dr. Rodriguez (2017) pathology: low-grade squamous intraepithelial lesion (mild squamous dysplasia, BA 1), no carcinoma identified.    NAFLD (nonalcoholic fatty liver disease) 2023    Nephrolithiasis 2023    Obesity     Osteopenia 2023    Primary hyperparathyroidism (HCC) 2023    S/P subtotal parathyroidectomy 2023    Sensorineural hearing loss (SNHL) of both ears 2023       Past Surgical History:   Procedure Laterality Date     SECTION      FRACTURE SURGERY      LASER ABLATION OF THE CERVIX      TONSILLECTOMY  1973    TUBAL LIGATION  1997    UPPER GASTROINTESTINAL ENDOSCOPY         Current Outpatient Medications   Medication Sig Dispense Refill    methylphenidate 36 MG CR tablet Take 1 tablet by mouth daily for 30 days. Max Daily Amount: 36 mg

## 2024-10-15 DIAGNOSIS — Z01.818 PREOP EXAMINATION: Primary | ICD-10-CM

## 2024-10-15 DIAGNOSIS — M65.341 TRIGGER RING FINGER OF RIGHT HAND: ICD-10-CM

## 2024-10-23 ENCOUNTER — PATIENT MESSAGE (OUTPATIENT)
Facility: CLINIC | Age: 54
End: 2024-10-23

## 2024-10-23 ENCOUNTER — PREP FOR PROCEDURE (OUTPATIENT)
Age: 54
End: 2024-10-23

## 2024-10-23 DIAGNOSIS — M65.341 TRIGGER RING FINGER OF RIGHT HAND: ICD-10-CM

## 2024-10-23 RX ORDER — FEXOFENADINE HCL 180 MG/1
180 TABLET ORAL DAILY
Qty: 90 TABLET | Refills: 3 | Status: SHIPPED | OUTPATIENT
Start: 2024-10-23

## 2024-10-25 NOTE — PROGRESS NOTES
Instructions for your surgery at [facility]      Today's Date:  10/25/2024      Patient's Name:  Carolyn Urias           Surgery Date:  11/5/2024              Please enter the main entrance of the hospital and check-in at the front security desk located in the lobby. They will direct you to the area to report for your surgery.     Do NOT eat or drink anything, including candy, gum, or ice chips after midnight prior to your surgery, unless you have specific instructions from your surgeon or anesthesia provider to do so.  Brush your teeth before coming to the hospital. You may swish with water, but do not swallow.  No smoking/Vaping/E-Cigarettes 24 hours prior to the day of surgery.  No alcohol 24 hours prior to the day of surgery.  No recreational drugs for one week prior to the day of surgery.  Bring Photo ID, Insurance information, and Co-pay if required on day of surgery.  Bring in pertinent legal documents, such as, Medical Power of , DNR, Advance Directive, etc.  Leave all valuables, including money/purse, at home.  Remove all jewelry, including ALL body piercings, nail polish, acrylic nails, and makeup (including mascara); no lotions, powders, deodorant, or perfume/cologne/after shave on the skin.  Follow instruction for Hibiclens washes and CHG wipes from surgeon's office.   Glasses and dentures may be worn to the hospital. They must be removed prior to surgery. Please bring case/container for glasses or dentures.   Contact lenses should not be worn on day of surgery.   Call your doctor's office if symptoms of a cold or illness develop within 24-48 hours prior to your surgery.  Call your doctor's office if you have any questions concerning insurance or co-pays.  15. AN ADULT (relative or friend 18 years or older) MUST DRIVE YOU HOME AFTER YOUR SURGERY.  16. Please make arrangements for a responsible adult (18 years or older) to be with you for 24 hours after your

## 2024-10-30 ENCOUNTER — OFFICE VISIT (OUTPATIENT)
Age: 54
End: 2024-10-30

## 2024-10-30 VITALS
BODY MASS INDEX: 34.72 KG/M2 | SYSTOLIC BLOOD PRESSURE: 96 MMHG | DIASTOLIC BLOOD PRESSURE: 64 MMHG | WEIGHT: 208.4 LBS | HEIGHT: 65 IN

## 2024-10-30 DIAGNOSIS — M65.341 TRIGGER RING FINGER OF RIGHT HAND: Primary | ICD-10-CM

## 2024-10-30 RX ORDER — SODIUM CHLORIDE 0.9 % (FLUSH) 0.9 %
5-40 SYRINGE (ML) INJECTION EVERY 12 HOURS SCHEDULED
OUTPATIENT
Start: 2024-10-30

## 2024-10-30 RX ORDER — SODIUM CHLORIDE 9 MG/ML
INJECTION, SOLUTION INTRAVENOUS PRN
OUTPATIENT
Start: 2024-10-30

## 2024-10-30 RX ORDER — SODIUM CHLORIDE 0.9 % (FLUSH) 0.9 %
5-40 SYRINGE (ML) INJECTION PRN
OUTPATIENT
Start: 2024-10-30

## 2024-10-30 NOTE — H&P
Carolyn Urias is a 53 y.o. female right handed .  Worker's Compensation and legal considerations: none    Chief Complaint   Patient presents with    H&P     Right ring trigger finger release      Pain Score:   0 - No pain    10/30/2024 HPI: Patient presents today for preoperative history and physical as she is scheduled for right ring trigger finger release.  She reports no changes since her previous visit.    2024 HPI: Patient presents today due to complaints of right ring finger pain and locking.  She also is concerned about bilateral little finger popping that she has had her whole life.  She reports this to be different from her right ring finger.  She is concerned she may have some hypermobility.    Initial HPI: Patient presents today with complaints of bilateral hand pain localized to the thenar eminences.    Date of onset: Indeterminate  Injury: No  Prior Treatment:  Yes: Comment: Right ring trigger finger injection    ROS: Review of Systems - General ROS: negative except HPI    Past Medical History:   Diagnosis Date    ADHD (attention deficit hyperactivity disorder)     Allergic rhinitis     Depression     Frequent PVCs 2023    Heterozygous alpha 1-antitrypsin deficiency (HCC) 2023    History of abnormal cervical Pap smear     ASCUS/ HPV HR positive on 2017; colposcopy by Dr. Rodriguez (2017) pathology: low-grade squamous intraepithelial lesion (mild squamous dysplasia, BA 1), no carcinoma identified.    NAFLD (nonalcoholic fatty liver disease) 2023    Nephrolithiasis 2023    Obesity     Osteopenia 2023    Primary hyperparathyroidism (HCC) 2023    S/P subtotal parathyroidectomy 2023    Sensorineural hearing loss (SNHL) of both ears 2023       Past Surgical History:   Procedure Laterality Date     SECTION  1997    FRACTURE SURGERY Bilateral     wrist    LASER ABLATION OF THE CERVIX      PARATHYROIDECTOMY

## 2024-10-31 ENCOUNTER — OFFICE VISIT (OUTPATIENT)
Age: 54
End: 2024-10-31
Payer: COMMERCIAL

## 2024-10-31 VITALS
DIASTOLIC BLOOD PRESSURE: 62 MMHG | BODY MASS INDEX: 34.16 KG/M2 | WEIGHT: 205 LBS | RESPIRATION RATE: 18 BRPM | HEART RATE: 64 BPM | TEMPERATURE: 97.1 F | SYSTOLIC BLOOD PRESSURE: 108 MMHG | HEIGHT: 65 IN | OXYGEN SATURATION: 100 %

## 2024-10-31 DIAGNOSIS — M65.341 TRIGGER RING FINGER OF RIGHT HAND: ICD-10-CM

## 2024-10-31 DIAGNOSIS — Z01.818 PREOP EXAMINATION: ICD-10-CM

## 2024-10-31 DIAGNOSIS — F90.9 ATTENTION DEFICIT HYPERACTIVITY DISORDER (ADHD), UNSPECIFIED ADHD TYPE: ICD-10-CM

## 2024-10-31 DIAGNOSIS — L30.9 DERMATITIS OF PERIANAL REGION: Primary | ICD-10-CM

## 2024-10-31 PROCEDURE — 99203 OFFICE O/P NEW LOW 30 MIN: CPT | Performed by: COLON & RECTAL SURGERY

## 2024-10-31 PROCEDURE — 46600 DIAGNOSTIC ANOSCOPY SPX: CPT | Performed by: COLON & RECTAL SURGERY

## 2024-10-31 RX ORDER — TRIAMCINOLONE ACETONIDE 1 MG/G
OINTMENT TOPICAL
Qty: 30 G | Refills: 0 | Status: SHIPPED | OUTPATIENT
Start: 2024-10-31

## 2024-10-31 RX ORDER — METHYLPHENIDATE HYDROCHLORIDE 36 MG/1
36 TABLET, EXTENDED RELEASE ORAL DAILY
Qty: 30 TABLET | Refills: 0 | Status: SHIPPED | OUTPATIENT
Start: 2024-10-31 | End: 2024-11-30

## 2024-10-31 NOTE — PATIENT INSTRUCTIONS
Perianal Dermatitis  Discontinue use of all wipes  Can use Balneol (or generic equivalent) on toilet paper for softer texture  [place], lotion-based soaps (e.g. Dove) to the perianal area when bathing  Avoid excessive scrubbing, 1-2 swipes then rinse off  [place] topical lotions may be helpful  Desitin, A&D ointment or generic zinc oxide  Triamcinolone ointment 3 times per day if prescribed  Do not use for prolonged periods as this can damage your skin

## 2024-10-31 NOTE — PROGRESS NOTES
HPI: Carolyn Urias is a 53 y.o. female presenting with chief complain of hemorrhoids.  Her main symptom is itching and difficulty cleaning.  This has been going on for 2 years.  Rarely she has pain and blood.  She moves her bowels daily.  Denies constipation, diarrhea or incontinence.  No family history of colon cancer.  She had a normal colonoscopy in  with a 10-year recall.    Past Medical History:   Diagnosis Date    ADHD (attention deficit hyperactivity disorder)     Allergic rhinitis     Depression     Frequent PVCs 2023    Heterozygous alpha 1-antitrypsin deficiency (HCC) 2023    History of abnormal cervical Pap smear     ASCUS/ HPV HR positive on 2017; colposcopy by Dr. Rodriguez (2017) pathology: low-grade squamous intraepithelial lesion (mild squamous dysplasia, BA 1), no carcinoma identified.    NAFLD (nonalcoholic fatty liver disease) 2023    Nephrolithiasis 2023    Obesity     Osteopenia 2023    Primary hyperparathyroidism (HCC) 2023    S/P subtotal parathyroidectomy 2023    Sensorineural hearing loss (SNHL) of both ears 2023       Past Surgical History:   Procedure Laterality Date     SECTION      COLONOSCOPY  2020    FRACTURE SURGERY Bilateral     wrist    LASER ABLATION OF THE CERVIX      PARATHYROIDECTOMY      Subtotal    TONSILLECTOMY  1973    TUBAL LIGATION      UPPER GASTROINTESTINAL ENDOSCOPY         Family History   Problem Relation Age of Onset    Breast Cancer Mother     Hearing Loss Mother     Obesity Mother     Colon Polyps Mother     Cancer Father     Diabetes Father     Hearing Loss Father     Obesity Father     Colon Polyps Father     No Known Problems Sister     Alcohol Abuse Brother     Cancer Brother     Substance Abuse Brother     Cancer Brother     Breast Cancer Maternal Aunt     Breast Cancer Maternal Grandmother        Social History     Socioeconomic History    Marital status:

## 2024-10-31 NOTE — TELEPHONE ENCOUNTER
[facility]    The last fill date for Methylphenidate was 10/04/2024 for a 30 d/s with qty 30    Last UDS: 12/19/2023  CSA Last signed: Not on file    PCP: Damari Arnold MD    Last appointment: 09/12/2024  Future Appointments   Date Time Provider Department Center   11/20/2024  3:00 PM Jasmine Lebron PA-C [facility] BS AMB   12/19/2024  8:15 AM [facility] LAB VISIT [facility] DEP   1/2/2025  8:00 AM Damari Arnold MD [facility] DEP   10/9/2025  8:20 AM Olamide Ellis DO [facility] BS AMB       Requested Prescriptions     Pending Prescriptions Disp Refills    methylphenidate 36 MG CR tablet 30 tablet 0     Sig: Take 1 tablet by mouth daily for 30 days. Max Daily Amount: 36 mg

## 2024-10-31 NOTE — PROGRESS NOTES
Perianal Dermatitis  Discontinue use of all wipes  Can use Balneol (or generic equivalent) on toilet paper for softer texture  [place], lotion-based soaps (e.g. Dove) to the perianal area when bathing  Avoid excessive scrubbing, 1-2 swipes then rinse off  [place] topical lotions may be helpful  Desitin, A&D ointment or generic zinc oxide  Triamcinolone ointment 3 times per day if prescribed  Do not use for prolonged periods as this can damage your skin    Cigarettes

## 2024-11-01 NOTE — DISCHARGE INSTRUCTIONS
Do not remove dressing for 3 days    Keep dressing clean and dry. Cover for showering.    Ice the wound/dressing multiple times per day to help with swelling    Elevate operative wound/dressing    Take post operative medications as indicated on the prescription label    Begin moving fingers immediately after surgery and continue moving fingers into a fist every hour that you're awake     You may experience significant bruising after surgery and it may extend up to your elbow. This is very common.     The following only applies to you if you are instructed to remove your bandage after 3 days:   Do not apply Peroxide, Neosporin, Vitamin E oil, or any ointment to the area.   Please wash the incisions gently with ANTI-BACTERIAL SOAP and WATER only.   Do not submerge in bath tub or dirty dish water  Cover incision with a band-aid as needed, but not all the time  Ok to shower as normal after bandage comes off    Reasons to call our office:  You remove the bandage (only if instructed) and notice the incision(s) is/are red, hot, warm, draining yellow/green/brown and appears infected.  You are having an allergic reaction to post operative medications   Your pain is significantly uncontrolled on the prescribed post operative regimen         DISCHARGE SUMMARY from Nurse    PATIENT INSTRUCTIONS:    After general anesthesia or intravenous sedation, for 24 hours or while taking prescription Narcotics:  Limit your activities  Do not drive and operate hazardous machinery  Do not make important personal or business decisions  Do  not drink alcoholic beverages  If you have not urinated within 8 hours after discharge, please contact your surgeon on call.    Report the following to your surgeon:  Excessive pain, swelling, redness or odor of or around the surgical area  Temperature over 100.5  Nausea and vomiting lasting longer than 4 hours or if unable to take medications  Any signs of decreased circulation or nerve impairment to

## 2024-11-04 ENCOUNTER — ANESTHESIA EVENT (OUTPATIENT)
Facility: HOSPITAL | Age: 54
End: 2024-11-04
Payer: COMMERCIAL

## 2024-11-05 ENCOUNTER — ANESTHESIA (OUTPATIENT)
Facility: HOSPITAL | Age: 54
End: 2024-11-05
Payer: COMMERCIAL

## 2024-11-05 ENCOUNTER — HOSPITAL ENCOUNTER (OUTPATIENT)
Facility: HOSPITAL | Age: 54
Setting detail: OUTPATIENT SURGERY
Discharge: HOME | End: 2024-11-05
Attending: ORTHOPAEDIC SURGERY | Admitting: ORTHOPAEDIC SURGERY
Payer: COMMERCIAL

## 2024-11-05 VITALS
SYSTOLIC BLOOD PRESSURE: 93 MMHG | HEART RATE: 54 BPM | RESPIRATION RATE: 16 BRPM | OXYGEN SATURATION: 98 % | TEMPERATURE: 97.6 F | WEIGHT: 205 LBS | BODY MASS INDEX: 34.16 KG/M2 | DIASTOLIC BLOOD PRESSURE: 54 MMHG | HEIGHT: 65 IN

## 2024-11-05 DIAGNOSIS — M65.341 TRIGGER RING FINGER OF RIGHT HAND: Primary | ICD-10-CM

## 2024-11-05 LAB — HCG UR QL: NEGATIVE

## 2024-11-05 PROCEDURE — 6360000002 HC RX W HCPCS: Performed by: NURSE ANESTHETIST, CERTIFIED REGISTERED

## 2024-11-05 PROCEDURE — 6360000002 HC RX W HCPCS: Performed by: ORTHOPAEDIC SURGERY

## 2024-11-05 PROCEDURE — 6370000000 HC RX 637 (ALT 250 FOR IP): Performed by: NURSE ANESTHETIST, CERTIFIED REGISTERED

## 2024-11-05 PROCEDURE — 2709999900 HC NON-CHARGEABLE SUPPLY: Performed by: ORTHOPAEDIC SURGERY

## 2024-11-05 PROCEDURE — A4217 STERILE WATER/SALINE, 500 ML: HCPCS | Performed by: ORTHOPAEDIC SURGERY

## 2024-11-05 PROCEDURE — 2500000003 HC RX 250 WO HCPCS: Performed by: NURSE ANESTHETIST, CERTIFIED REGISTERED

## 2024-11-05 PROCEDURE — 2580000003 HC RX 258: Performed by: ORTHOPAEDIC SURGERY

## 2024-11-05 PROCEDURE — 7100000001 HC PACU RECOVERY - ADDTL 15 MIN: Performed by: ORTHOPAEDIC SURGERY

## 2024-11-05 PROCEDURE — 2500000003 HC RX 250 WO HCPCS: Performed by: ORTHOPAEDIC SURGERY

## 2024-11-05 PROCEDURE — 2580000003 HC RX 258: Performed by: NURSE ANESTHETIST, CERTIFIED REGISTERED

## 2024-11-05 PROCEDURE — 3600000012 HC SURGERY LEVEL 2 ADDTL 15MIN: Performed by: ORTHOPAEDIC SURGERY

## 2024-11-05 PROCEDURE — 3600000002 HC SURGERY LEVEL 2 BASE: Performed by: ORTHOPAEDIC SURGERY

## 2024-11-05 PROCEDURE — 7100000010 HC PHASE II RECOVERY - FIRST 15 MIN: Performed by: ORTHOPAEDIC SURGERY

## 2024-11-05 PROCEDURE — 7100000011 HC PHASE II RECOVERY - ADDTL 15 MIN: Performed by: ORTHOPAEDIC SURGERY

## 2024-11-05 PROCEDURE — 3700000000 HC ANESTHESIA ATTENDED CARE: Performed by: ORTHOPAEDIC SURGERY

## 2024-11-05 PROCEDURE — 7100000000 HC PACU RECOVERY - FIRST 15 MIN: Performed by: ORTHOPAEDIC SURGERY

## 2024-11-05 PROCEDURE — 81025 URINE PREGNANCY TEST: CPT

## 2024-11-05 PROCEDURE — 3700000001 HC ADD 15 MINUTES (ANESTHESIA): Performed by: ORTHOPAEDIC SURGERY

## 2024-11-05 RX ORDER — SODIUM CHLORIDE, SODIUM LACTATE, POTASSIUM CHLORIDE, CALCIUM CHLORIDE 600; 310; 30; 20 MG/100ML; MG/100ML; MG/100ML; MG/100ML
INJECTION, SOLUTION INTRAVENOUS CONTINUOUS
Status: DISCONTINUED | OUTPATIENT
Start: 2024-11-05 | End: 2024-11-05 | Stop reason: HOSPADM

## 2024-11-05 RX ORDER — PROPOFOL 10 MG/ML
INJECTION, EMULSION INTRAVENOUS
Status: DISCONTINUED | OUTPATIENT
Start: 2024-11-05 | End: 2024-11-05 | Stop reason: SDUPTHER

## 2024-11-05 RX ORDER — SODIUM CHLORIDE 0.9 % (FLUSH) 0.9 %
5-40 SYRINGE (ML) INJECTION PRN
Status: DISCONTINUED | OUTPATIENT
Start: 2024-11-05 | End: 2024-11-05 | Stop reason: HOSPADM

## 2024-11-05 RX ORDER — SODIUM CHLORIDE 9 MG/ML
INJECTION, SOLUTION INTRAVENOUS PRN
Status: DISCONTINUED | OUTPATIENT
Start: 2024-11-05 | End: 2024-11-05 | Stop reason: HOSPADM

## 2024-11-05 RX ORDER — SODIUM CHLORIDE 0.9 % (FLUSH) 0.9 %
5-40 SYRINGE (ML) INJECTION EVERY 12 HOURS SCHEDULED
Status: DISCONTINUED | OUTPATIENT
Start: 2024-11-05 | End: 2024-11-05 | Stop reason: HOSPADM

## 2024-11-05 RX ORDER — TRAMADOL HYDROCHLORIDE 50 MG/1
50 TABLET ORAL
Status: DISCONTINUED | OUTPATIENT
Start: 2024-11-05 | End: 2024-11-05 | Stop reason: HOSPADM

## 2024-11-05 RX ORDER — MAGNESIUM HYDROXIDE 1200 MG/15ML
LIQUID ORAL CONTINUOUS PRN
Status: DISCONTINUED | OUTPATIENT
Start: 2024-11-05 | End: 2024-11-05 | Stop reason: HOSPADM

## 2024-11-05 RX ORDER — HYDROCODONE BITARTRATE AND ACETAMINOPHEN 5; 325 MG/1; MG/1
1 TABLET ORAL EVERY 6 HOURS PRN
Qty: 12 TABLET | Refills: 0 | Status: SHIPPED | OUTPATIENT
Start: 2024-11-05 | End: 2024-11-08

## 2024-11-05 RX ORDER — ONDANSETRON 2 MG/ML
4 INJECTION INTRAMUSCULAR; INTRAVENOUS
Status: DISCONTINUED | OUTPATIENT
Start: 2024-11-05 | End: 2024-11-05 | Stop reason: HOSPADM

## 2024-11-05 RX ORDER — LIDOCAINE HYDROCHLORIDE 10 MG/ML
1 INJECTION, SOLUTION EPIDURAL; INFILTRATION; INTRACAUDAL; PERINEURAL
Status: DISCONTINUED | OUTPATIENT
Start: 2024-11-05 | End: 2024-11-05 | Stop reason: HOSPADM

## 2024-11-05 RX ORDER — FAMOTIDINE 20 MG/1
20 TABLET, FILM COATED ORAL ONCE
Status: COMPLETED | OUTPATIENT
Start: 2024-11-05 | End: 2024-11-05

## 2024-11-05 RX ORDER — FENTANYL CITRATE 50 UG/ML
50 INJECTION, SOLUTION INTRAMUSCULAR; INTRAVENOUS EVERY 5 MIN PRN
Status: DISCONTINUED | OUTPATIENT
Start: 2024-11-05 | End: 2024-11-05 | Stop reason: HOSPADM

## 2024-11-05 RX ORDER — DICLOFENAC SODIUM 75 MG/1
75 TABLET, DELAYED RELEASE ORAL EVERY 12 HOURS PRN
Qty: 20 TABLET | Refills: 0 | Status: SHIPPED | OUTPATIENT
Start: 2024-11-05 | End: 2024-11-15

## 2024-11-05 RX ORDER — SODIUM CHLORIDE 0.9 % (FLUSH) 0.9 %
5-40 SYRINGE (ML) INJECTION PRN
Status: DISCONTINUED | OUTPATIENT
Start: 2024-11-05 | End: 2024-11-05 | Stop reason: SDUPTHER

## 2024-11-05 RX ORDER — PROCHLORPERAZINE EDISYLATE 5 MG/ML
5 INJECTION INTRAMUSCULAR; INTRAVENOUS
Status: DISCONTINUED | OUTPATIENT
Start: 2024-11-05 | End: 2024-11-05 | Stop reason: HOSPADM

## 2024-11-05 RX ORDER — LIDOCAINE HYDROCHLORIDE 20 MG/ML
INJECTION, SOLUTION EPIDURAL; INFILTRATION; INTRACAUDAL; PERINEURAL
Status: DISCONTINUED | OUTPATIENT
Start: 2024-11-05 | End: 2024-11-05 | Stop reason: SDUPTHER

## 2024-11-05 RX ORDER — ONDANSETRON 2 MG/ML
INJECTION INTRAMUSCULAR; INTRAVENOUS
Status: DISCONTINUED | OUTPATIENT
Start: 2024-11-05 | End: 2024-11-05 | Stop reason: SDUPTHER

## 2024-11-05 RX ORDER — FENTANYL CITRATE 50 UG/ML
25 INJECTION, SOLUTION INTRAMUSCULAR; INTRAVENOUS EVERY 5 MIN PRN
Status: DISCONTINUED | OUTPATIENT
Start: 2024-11-05 | End: 2024-11-05 | Stop reason: HOSPADM

## 2024-11-05 RX ORDER — NALOXONE HYDROCHLORIDE 0.4 MG/ML
INJECTION, SOLUTION INTRAMUSCULAR; INTRAVENOUS; SUBCUTANEOUS PRN
Status: DISCONTINUED | OUTPATIENT
Start: 2024-11-05 | End: 2024-11-05 | Stop reason: HOSPADM

## 2024-11-05 RX ADMIN — PROPOFOL 30 MG: 10 INJECTION, EMULSION INTRAVENOUS at 07:45

## 2024-11-05 RX ADMIN — WATER 2000 MG: 1 INJECTION INTRAMUSCULAR; INTRAVENOUS; SUBCUTANEOUS at 07:37

## 2024-11-05 RX ADMIN — PROPOFOL 10 MG: 10 INJECTION, EMULSION INTRAVENOUS at 08:04

## 2024-11-05 RX ADMIN — ONDANSETRON 4 MG: 2 INJECTION INTRAMUSCULAR; INTRAVENOUS at 07:59

## 2024-11-05 RX ADMIN — PROPOFOL 30 MG: 10 INJECTION, EMULSION INTRAVENOUS at 07:42

## 2024-11-05 RX ADMIN — PROPOFOL 20 MG: 10 INJECTION, EMULSION INTRAVENOUS at 07:49

## 2024-11-05 RX ADMIN — PROPOFOL 30 MG: 10 INJECTION, EMULSION INTRAVENOUS at 07:54

## 2024-11-05 RX ADMIN — LIDOCAINE HYDROCHLORIDE 40 MG: 20 INJECTION, SOLUTION EPIDURAL; INFILTRATION; INTRACAUDAL; PERINEURAL at 07:39

## 2024-11-05 RX ADMIN — FAMOTIDINE 20 MG: 20 TABLET ORAL at 06:41

## 2024-11-05 RX ADMIN — PROPOFOL 100 MG: 10 INJECTION, EMULSION INTRAVENOUS at 07:39

## 2024-11-05 RX ADMIN — SODIUM CHLORIDE, POTASSIUM CHLORIDE, SODIUM LACTATE AND CALCIUM CHLORIDE: 600; 310; 30; 20 INJECTION, SOLUTION INTRAVENOUS at 06:40

## 2024-11-05 RX ADMIN — PROPOFOL 30 MG: 10 INJECTION, EMULSION INTRAVENOUS at 07:58

## 2024-11-05 ASSESSMENT — PAIN - FUNCTIONAL ASSESSMENT
PAIN_FUNCTIONAL_ASSESSMENT: 0-10
PAIN_FUNCTIONAL_ASSESSMENT: ACTIVITIES ARE NOT PREVENTED
PAIN_FUNCTIONAL_ASSESSMENT: ACTIVITIES ARE NOT PREVENTED

## 2024-11-05 ASSESSMENT — PAIN SCALES - GENERAL
PAINLEVEL_OUTOF10: 0
PAINLEVEL_OUTOF10: 0

## 2024-11-05 NOTE — OP NOTE
Operative Note      Patient: Carolyn Urias  YOB: 1970  MRN: [id number]    Date of Procedure: 11/5/2024    Pre-Op Diagnosis Codes:      * Trigger ring finger of right hand [M65.341]    Post-Op Diagnosis: Same       Procedure(s):  Right Ring Trigger Finger Release    Surgeon(s):  Peter Julio DO    Assistant:   Surgical Assistant: Dione Brown    Anesthesia: Monitor Anesthesia Care    Estimated Blood Loss (mL): Minimal    Complications: None    Specimens:   * No specimens in log *    Implants:  * No implants in log *      Drains: * No LDAs found *    Findings:  Infection Present At Time Of Surgery (PATOS) (choose all levels that have infection present):  No infection present  Other Findings: thickened RF A1 pulley with locking versus hypermobility of little finger during intra-operative testing.     Detailed Description of Procedure:     Indications for procedure been outlined in the perioperative documentation, most notably being not amenable to conservative treatment.     Informed consent was obtained from the patient.  The risks and benefits of the procedure were discussed with the patient.  They include but are not limited to neurovascular injury, tendon/ligamentous injury, blood loss, infection, need for further surgery, hematoma, neuroma, seroma, chronic pain, chronic stiffness, complications from anesthesia including death, and the possibility of boyd Covid.    After informed consent was obtained, the patient was taken back to the operative suite.  A tourniquet was applied to the operative extremity and it was prepped and draped in the normal sterile fashion.  The arm was exsanguinated and the tourniquet was elevated to 250 mmHg.    Attention was then turned to the distal palmar crease overlying the A1 pulley.  An incision was made over the crease approximately a 1 cm in length.  The subcutaneous tissues were dissected and electrocautery was used for hemostasis.  After blot

## 2024-11-05 NOTE — H&P
Update History & Physical    The patient's History and Physical of October 30, 2024 was reviewed with the patient and I examined the patient. There was no change. The surgical site was confirmed by the patient and me.     Plan: The risks, benefits, expected outcome, and alternative to the recommended procedure have been discussed with the patient. Patient understands and wants to proceed with the procedure.     Electronically signed by Peter Julio DO on 11/5/2024 at 7:16 AM

## 2024-11-05 NOTE — ANESTHESIA PRE PROCEDURE
Department of Anesthesiology  Preprocedure Note       Name:  Carolyn Urias   Age:  53 y.o.  :  1970                                          MRN:  [id number]         Date:  2024      Surgeon: Surgeon(s):  Peter Julio DO    Procedure: Procedure(s):  Right Ring Trigger Finger Release    Medications prior to admission:   Prior to Admission medications    Medication Sig Start Date End Date Taking? Authorizing Provider   triamcinolone (KENALOG) 0.1 % ointment Apply topically 3 times daily. 10/31/24  Yes Bola Murillo MD   methylphenidate 36 MG CR tablet Take 1 tablet by mouth daily for 30 days. Max Daily Amount: 36 mg 10/31/24 11/30/24 Yes Damari Arnold MD   fexofenadine (ALLEGRA) 180 MG tablet Take 1 tablet by mouth daily 10/23/24  Yes Damari Arnold MD   fluticasone (FLONASE) 50 MCG/ACT nasal spray 2 sprays by Nasal route daily 24  Yes ProviderAnant MD   Multiple Vitamins-Minerals (THERAPEUTIC MULTIVITAMIN-MINERALS) tablet Take 1 tablet by mouth daily   Yes ProviderAnant MD   Eflornithine HCl (VANIQA) 13.9 % CREA Apply to affected area twice daily at least 8 hours apart. 23  Yes Damari Arnold MD   clobetasol (TEMOVATE) 0.05 % external solution Apply topically to scalp daily.  Patient taking differently: Apply topically as needed Apply topically to scalp daily. 24   Damari Arnold MD       Current medications:    Current Facility-Administered Medications   Medication Dose Route Frequency Provider Last Rate Last Admin   • lidocaine PF 1 % injection 1 mL  1 mL IntraDERmal Once PRN Manjeet Rapp APRN - CRNA       • lactated ringers infusion   IntraVENous Continuous Manjeet Rapp APRN - CRNA 125 mL/hr at 24 0711 NoRateChange at 24 0711   • sodium chloride flush 0.9 % injection 5-40 mL  5-40 mL IntraVENous PRN Manjeet Rapp APRN - CRNA       • sodium chloride flush 0.9 % injection 5-40 mL  5-40 mL IntraVENous 2 times per

## 2024-11-05 NOTE — ANESTHESIA POSTPROCEDURE EVALUATION
Department of Anesthesiology  Postprocedure Note    Patient: Carolyn Urias  MRN: [id number]  YOB: 1970  Date of evaluation: 11/5/2024    Procedure Summary       Date: 11/05/24 Room / Location: [facility] MAIN 03 / [facility] MAIN OR    Anesthesia Start: 0733 Anesthesia Stop: 0820    Procedure: Right Ring Trigger Finger Release (Right: Hand) Diagnosis:       Trigger ring finger of right hand      (Trigger ring finger of right hand [M65.341])    Surgeons: Peter Julio DO Responsible Provider: Darian Kellogg MD    Anesthesia Type: MAC, general ASA Status: 2            Anesthesia Type: No value filed.    Ronit Phase I: Ronit Score: 10    Ronit Phase II: Ronit Score: 10    Anesthesia Post Evaluation    Patient location during evaluation: bedside  Patient participation: complete - patient participated  Airway patency: patent  Cardiovascular status: hemodynamically stable  Respiratory status: acceptable  Hydration status: stable    No notable events documented.

## 2024-11-05 NOTE — BRIEF OP NOTE
Brief Postoperative Note      Patient: Carolyn Urias  YOB: 1970  MRN: [id number]    Date of Procedure: 11/5/2024    Pre-Op Diagnosis Codes:      * Trigger ring finger of right hand [M65.341]    Post-Op Diagnosis: Same       Procedure(s):  Right Ring Trigger Finger Release    Surgeon(s):  Peter Julio DO    Assistant:  Surgical Assistant: Dione Brown    Anesthesia: Monitor Anesthesia Care    Estimated Blood Loss (mL): Minimal    Complications: None    Specimens:   * No specimens in log *    Implants:  * No implants in log *      Drains: * No LDAs found *    Findings:  Infection Present At Time Of Surgery (PATOS) (choose all levels that have infection present):  No infection present  Other Findings: thickened RF A1 pulley with locking versus hypermobility of little finger during intra-operative testing.    Electronically signed by Peter Julio DO on 11/5/2024 at 8:25 AM

## 2024-11-12 RX ORDER — DICLOFENAC SODIUM 75 MG/1
75 TABLET, DELAYED RELEASE ORAL EVERY 12 HOURS PRN
Qty: 20 TABLET | Refills: 0 | OUTPATIENT
Start: 2024-11-12 | End: 2024-11-22

## 2024-11-13 ENCOUNTER — PATIENT MESSAGE (OUTPATIENT)
Age: 54
End: 2024-11-13

## 2024-11-13 ENCOUNTER — TELEPHONE (OUTPATIENT)
Age: 54
End: 2024-11-13

## 2024-11-13 RX ORDER — CEPHALEXIN 500 MG/1
500 CAPSULE ORAL 4 TIMES DAILY
Qty: 20 CAPSULE | Refills: 0 | Status: SHIPPED | OUTPATIENT
Start: 2024-11-13 | End: 2024-11-18

## 2024-11-13 NOTE — TELEPHONE ENCOUNTER
Post-Op patient says her wound is gaping and not staying closed.  She says yesterday she saw the stitches but now today she can't see any stiches at all.    She also said she took a few pictures of the wound as well. I encouraged her to attach them to [facility] so Dr. Julio can take a look at it.      She will be attaching the images soon.    Patient can be reached at [phone].

## 2024-11-20 ENCOUNTER — OFFICE VISIT (OUTPATIENT)
Age: 54
End: 2024-11-20
Payer: COMMERCIAL

## 2024-11-20 VITALS — HEIGHT: 65 IN | BODY MASS INDEX: 34.16 KG/M2 | WEIGHT: 205 LBS

## 2024-11-20 DIAGNOSIS — M65.341 TRIGGER RING FINGER OF RIGHT HAND: ICD-10-CM

## 2024-11-20 DIAGNOSIS — M25.531 RIGHT WRIST PAIN: Primary | ICD-10-CM

## 2024-11-20 DIAGNOSIS — Z98.890 S/P TRIGGER FINGER RELEASE: ICD-10-CM

## 2024-11-20 DIAGNOSIS — M35.7 BENIGN JOINT HYPERMOBILITY SYNDROME: ICD-10-CM

## 2024-11-20 PROCEDURE — 73110 X-RAY EXAM OF WRIST: CPT

## 2024-11-20 PROCEDURE — 99212 OFFICE O/P EST SF 10 MIN: CPT

## 2024-11-20 NOTE — PROGRESS NOTES
Carolyn Urias is a 54 y.o. female right handed .  Worker's Compensation and legal considerations: none    Chief Complaint   Patient presents with    Post-Op Check     Right ring finger     Pain Score:   0 - No pain    11/20/2024 HPI: Patient presents today with a concern of right wrist pain which she reports to be worse with extension and direct pressure such as when she's working out trying to do push ups or planks. She reports flares of this over the last 6 or so months and has been utilizing a brace as needed during flares. She reports history of right distal radius ORIF in ~2011.     She is also here for a postoperative visit now approximately 2 weeks s/p right ring trigger finger release on 11/5/2024 with Dr. Julio and denies any further locking or triggering of her ring finger. She did have post-op wound dehiscence and was treated with antibiotics and wound care. She does continue to have right little finger popping which has been present her whole life.     *Intra-op findings:  thickened RF A1 pulley with locking versus hypermobility of little finger during intra-operative testing.     10/30/2024 HPI: Patient presents today for preoperative history and physical as she is scheduled for right ring trigger finger release.  She reports no changes since her previous visit.    8/5/2024 HPI: Patient presents today due to complaints of right ring finger pain and locking.  She also is concerned about bilateral little finger popping that she has had her whole life.  She reports this to be different from her right ring finger.  She is concerned she may have some hypermobility.    Initial HPI: Patient presents today with complaints of bilateral hand pain localized to the thenar eminences.    Date of onset: Indeterminate  Injury: No  Prior Treatment:  Yes: Comment: Right ring trigger finger injection    ROS: Review of Systems - General ROS: negative except HPI    Past Medical History:   Diagnosis

## 2024-12-18 ENCOUNTER — OFFICE VISIT (OUTPATIENT)
Age: 54
End: 2024-12-18

## 2024-12-18 VITALS — HEIGHT: 65 IN | BODY MASS INDEX: 34.16 KG/M2 | WEIGHT: 205 LBS

## 2024-12-18 DIAGNOSIS — M19.031 ARTHRITIS OF RIGHT WRIST: Primary | ICD-10-CM

## 2024-12-18 DIAGNOSIS — M65.341 TRIGGER RING FINGER OF RIGHT HAND: ICD-10-CM

## 2024-12-18 DIAGNOSIS — Z98.890 HISTORY OF OPEN REDUCTION AND INTERNAL FIXATION (ORIF) PROCEDURE: ICD-10-CM

## 2024-12-18 DIAGNOSIS — L90.5 PAIN IN SURGICAL SCAR: ICD-10-CM

## 2024-12-18 DIAGNOSIS — R52 PAIN IN SURGICAL SCAR: ICD-10-CM

## 2024-12-18 DIAGNOSIS — S52.509S CLOSED FRACTURE OF DISTAL END OF RADIUS, UNSPECIFIED FRACTURE MORPHOLOGY, UNSPECIFIED LATERALITY, SEQUELA: ICD-10-CM

## 2024-12-18 DIAGNOSIS — Z98.890 S/P TRIGGER FINGER RELEASE: ICD-10-CM

## 2024-12-18 RX ORDER — LIDOCAINE HYDROCHLORIDE 10 MG/ML
0.5 INJECTION, SOLUTION INFILTRATION; PERINEURAL ONCE
Status: COMPLETED | OUTPATIENT
Start: 2024-12-18 | End: 2024-12-18

## 2024-12-18 RX ADMIN — LIDOCAINE HYDROCHLORIDE 0.5 ML: 10 INJECTION, SOLUTION INFILTRATION; PERINEURAL at 15:20

## 2024-12-18 NOTE — PROGRESS NOTES
focal deficit present.      Mental Status: She is alert and oriented to person, place, and time.   Psychiatric:         Mood and Affect: Mood normal.         Behavior: Behavior normal.        Right hand: Incision well healed. There is thick, subcutaneous scar tissue palpated. Mild TTP at the PIPJ. Neurovascularly intact distally.  Range of motion of digits full. Non-tender at the little finger a1 pulley.    Right wrist: Exquisite tenderness about the SL joint. ROM is full however painful with terminal extension and flexion. Grossly neurovascularly intact distally.     Imagin2024 3 views of the right wrist and family reviewed by me on the date of examination and significant for orthopedic hardware in situ.  No acute osseous abnormalities are identified.  Mild degenerative changes seen at the first CMC joint.  There are cystic changes about the ulnar aspect of the lunate.      Impression     Diagnosis Orders   1. Arthritis of right wrist  DRAIN/INJECT INTERMEDIATE JOINT/BURSA    lidocaine 1 % injection 0.5 mL    triamcinolone acetonide (KENALOG) injection 5 mg      2. Closed fracture of distal end of radius, unspecified fracture morphology, unspecified laterality, sequela  DRAIN/INJECT INTERMEDIATE JOINT/BURSA    lidocaine 1 % injection 0.5 mL    triamcinolone acetonide (KENALOG) injection 5 mg      3. History of open reduction and internal fixation (ORIF) procedure  DRAIN/INJECT INTERMEDIATE JOINT/BURSA    lidocaine 1 % injection 0.5 mL    triamcinolone acetonide (KENALOG) injection 5 mg      4. Pain in surgical scar        5. S/P trigger finger release        6. Trigger ring finger of right hand                    Plan:     1. Arthritis of right wrist  2. Closed fracture of distal end of radius, unspecified fracture morphology, unspecified laterality, sequela  3. History of open reduction and internal fixation (ORIF) procedure  *Right wrist joint injection administered today - expectations

## 2024-12-19 ENCOUNTER — HOSPITAL ENCOUNTER (OUTPATIENT)
Facility: HOSPITAL | Age: 54
Setting detail: SPECIMEN
Discharge: HOME | End: 2024-12-22
Payer: COMMERCIAL

## 2024-12-19 DIAGNOSIS — E04.1 LEFT THYROID NODULE: ICD-10-CM

## 2024-12-19 DIAGNOSIS — Z00.00 LABORATORY TESTS ORDERED AS PART OF A COMPLETE PHYSICAL EXAM (CPE): ICD-10-CM

## 2024-12-19 DIAGNOSIS — M85.89 OSTEOPENIA OF MULTIPLE SITES: ICD-10-CM

## 2024-12-19 DIAGNOSIS — F90.9 ATTENTION DEFICIT HYPERACTIVITY DISORDER (ADHD), UNSPECIFIED ADHD TYPE: ICD-10-CM

## 2024-12-19 DIAGNOSIS — E88.01 HETEROZYGOUS ALPHA 1-ANTITRYPSIN DEFICIENCY (HCC): ICD-10-CM

## 2024-12-19 LAB — 25(OH)D3 SERPL-MCNC: 43.5 NG/ML (ref 30–100)

## 2024-12-19 PROCEDURE — 36415 COLL VENOUS BLD VENIPUNCTURE: CPT

## 2024-12-19 PROCEDURE — 85025 COMPLETE CBC W/AUTO DIFF WBC: CPT

## 2024-12-19 PROCEDURE — 80061 LIPID PANEL: CPT

## 2024-12-19 PROCEDURE — 80053 COMPREHEN METABOLIC PANEL: CPT

## 2024-12-19 PROCEDURE — 82306 VITAMIN D 25 HYDROXY: CPT

## 2024-12-19 PROCEDURE — 84443 ASSAY THYROID STIM HORMONE: CPT

## 2024-12-19 RX ORDER — METHYLPHENIDATE HYDROCHLORIDE 36 MG/1
36 TABLET, EXTENDED RELEASE ORAL DAILY
Qty: 30 TABLET | Refills: 0 | Status: SHIPPED | OUTPATIENT
Start: 2024-12-19 | End: 2025-01-18

## 2024-12-20 LAB
ALBUMIN SERPL-MCNC: 3.7 G/DL (ref 3.4–5)
ALBUMIN/GLOB SERPL: 1 (ref 0.8–1.7)
ALP SERPL-CCNC: 83 U/L (ref 45–117)
ALT SERPL-CCNC: 38 U/L (ref 13–56)
ANION GAP SERPL CALC-SCNC: 5 MMOL/L (ref 3–18)
AST SERPL-CCNC: 37 U/L (ref 10–38)
BASOPHILS # BLD: 0 K/UL (ref 0–0.1)
BASOPHILS NFR BLD: 1 % (ref 0–2)
BILIRUB SERPL-MCNC: 0.4 MG/DL (ref 0.2–1)
BUN SERPL-MCNC: 11 MG/DL (ref 7–18)
BUN/CREAT SERPL: 13 (ref 12–20)
CALCIUM SERPL-MCNC: 9.7 MG/DL (ref 8.5–10.1)
CHLORIDE SERPL-SCNC: 106 MMOL/L (ref 100–111)
CHOLEST SERPL-MCNC: 192 MG/DL
CO2 SERPL-SCNC: 27 MMOL/L (ref 21–32)
CREAT SERPL-MCNC: 0.85 MG/DL (ref 0.6–1.3)
DIFFERENTIAL METHOD BLD: NORMAL
EOSINOPHIL # BLD: 0.1 K/UL (ref 0–0.4)
EOSINOPHIL NFR BLD: 2 % (ref 0–5)
ERYTHROCYTE [DISTWIDTH] IN BLOOD BY AUTOMATED COUNT: 13.7 % (ref 11.6–14.5)
GLOBULIN SER CALC-MCNC: 3.6 G/DL (ref 2–4)
GLUCOSE SERPL-MCNC: 91 MG/DL (ref 74–99)
HCT VFR BLD AUTO: 40.4 % (ref 35–45)
HDLC SERPL-MCNC: 56 MG/DL (ref 40–60)
HDLC SERPL: 3.4 (ref 0–5)
HGB BLD-MCNC: 13.1 G/DL (ref 12–16)
IMM GRANULOCYTES # BLD AUTO: 0 K/UL (ref 0–0.04)
IMM GRANULOCYTES NFR BLD AUTO: 0 % (ref 0–0.5)
LDLC SERPL CALC-MCNC: 124.2 MG/DL (ref 0–100)
LIPID PANEL: ABNORMAL
LYMPHOCYTES # BLD: 3 K/UL (ref 0.9–3.6)
LYMPHOCYTES NFR BLD: 47 % (ref 21–52)
MCH RBC QN AUTO: 29.2 PG (ref 24–34)
MCHC RBC AUTO-ENTMCNC: 32.4 G/DL (ref 31–37)
MCV RBC AUTO: 90 FL (ref 78–100)
MONOCYTES # BLD: 0.6 K/UL (ref 0.05–1.2)
MONOCYTES NFR BLD: 9 % (ref 3–10)
NEUTS SEG # BLD: 2.7 K/UL (ref 1.8–8)
NEUTS SEG NFR BLD: 41 % (ref 40–73)
NRBC # BLD: 0 K/UL (ref 0–0.01)
NRBC BLD-RTO: 0 PER 100 WBC
PLATELET # BLD AUTO: 372 K/UL (ref 135–420)
PMV BLD AUTO: 10.7 FL (ref 9.2–11.8)
POTASSIUM SERPL-SCNC: 4.7 MMOL/L (ref 3.5–5.5)
PROT SERPL-MCNC: 7.3 G/DL (ref 6.4–8.2)
RBC # BLD AUTO: 4.49 M/UL (ref 4.2–5.3)
SODIUM SERPL-SCNC: 138 MMOL/L (ref 136–145)
TRIGL SERPL-MCNC: 59 MG/DL
TSH SERPL DL<=0.05 MIU/L-ACNC: 2.29 UIU/ML (ref 0.36–3.74)
VLDLC SERPL CALC-MCNC: 11.8 MG/DL
WBC # BLD AUTO: 6.4 K/UL (ref 4.6–13.2)

## 2025-01-02 ENCOUNTER — HOSPITAL ENCOUNTER (OUTPATIENT)
Facility: HOSPITAL | Age: 55
Setting detail: SPECIMEN
Discharge: HOME | End: 2025-01-02
Payer: COMMERCIAL

## 2025-01-02 ENCOUNTER — TELEPHONE (OUTPATIENT)
Facility: CLINIC | Age: 55
End: 2025-01-02

## 2025-01-02 ENCOUNTER — OFFICE VISIT (OUTPATIENT)
Facility: CLINIC | Age: 55
End: 2025-01-02

## 2025-01-02 VITALS
HEART RATE: 57 BPM | OXYGEN SATURATION: 99 % | RESPIRATION RATE: 14 BRPM | DIASTOLIC BLOOD PRESSURE: 60 MMHG | BODY MASS INDEX: 34.32 KG/M2 | SYSTOLIC BLOOD PRESSURE: 104 MMHG | WEIGHT: 206 LBS | TEMPERATURE: 98.8 F | HEIGHT: 65 IN

## 2025-01-02 DIAGNOSIS — Z51.81 MEDICATION MONITORING ENCOUNTER: ICD-10-CM

## 2025-01-02 DIAGNOSIS — E88.01 HETEROZYGOUS ALPHA 1-ANTITRYPSIN DEFICIENCY (HCC): ICD-10-CM

## 2025-01-02 DIAGNOSIS — K76.0 NAFLD (NONALCOHOLIC FATTY LIVER DISEASE): ICD-10-CM

## 2025-01-02 DIAGNOSIS — E21.0 PRIMARY HYPERPARATHYROIDISM (HCC): ICD-10-CM

## 2025-01-02 DIAGNOSIS — E66.811 CLASS 1 OBESITY DUE TO EXCESS CALORIES WITH BODY MASS INDEX (BMI) OF 34.0 TO 34.9 IN ADULT, UNSPECIFIED WHETHER SERIOUS COMORBIDITY PRESENT: ICD-10-CM

## 2025-01-02 DIAGNOSIS — E66.09 CLASS 1 OBESITY DUE TO EXCESS CALORIES WITH BODY MASS INDEX (BMI) OF 34.0 TO 34.9 IN ADULT, UNSPECIFIED WHETHER SERIOUS COMORBIDITY PRESENT: ICD-10-CM

## 2025-01-02 DIAGNOSIS — R13.10 DYSPHAGIA, UNSPECIFIED TYPE: ICD-10-CM

## 2025-01-02 DIAGNOSIS — Z00.00 ENCOUNTER FOR ROUTINE HISTORY AND PHYSICAL EXAM IN FEMALE: Primary | ICD-10-CM

## 2025-01-02 DIAGNOSIS — Z98.890 S/P SUBTOTAL PARATHYROIDECTOMY: ICD-10-CM

## 2025-01-02 DIAGNOSIS — Z23 ENCOUNTER FOR IMMUNIZATION: ICD-10-CM

## 2025-01-02 DIAGNOSIS — G90.1 DYSAUTONOMIA (HCC): ICD-10-CM

## 2025-01-02 DIAGNOSIS — Z90.89 S/P SUBTOTAL PARATHYROIDECTOMY: ICD-10-CM

## 2025-01-02 DIAGNOSIS — H90.3 SENSORINEURAL HEARING LOSS (SNHL) OF BOTH EARS: ICD-10-CM

## 2025-01-02 DIAGNOSIS — M85.89 OSTEOPENIA OF MULTIPLE SITES: ICD-10-CM

## 2025-01-02 DIAGNOSIS — E78.00 PURE HYPERCHOLESTEROLEMIA: ICD-10-CM

## 2025-01-02 DIAGNOSIS — F90.0 ATTENTION DEFICIT HYPERACTIVITY DISORDER (ADHD), PREDOMINANTLY INATTENTIVE TYPE: ICD-10-CM

## 2025-01-02 DIAGNOSIS — K21.9 GASTROESOPHAGEAL REFLUX DISEASE WITHOUT ESOPHAGITIS: ICD-10-CM

## 2025-01-02 DIAGNOSIS — Z00.00 LABORATORY TESTS ORDERED AS PART OF A COMPLETE PHYSICAL EXAM (CPE): ICD-10-CM

## 2025-01-02 DIAGNOSIS — M15.0 PRIMARY OSTEOARTHRITIS INVOLVING MULTIPLE JOINTS: ICD-10-CM

## 2025-01-02 DIAGNOSIS — M35.7 BENIGN JOINT HYPERMOBILITY SYNDROME: ICD-10-CM

## 2025-01-02 PROCEDURE — 80307 DRUG TEST PRSMV CHEM ANLYZR: CPT

## 2025-01-02 SDOH — ECONOMIC STABILITY: INCOME INSECURITY: HOW HARD IS IT FOR YOU TO PAY FOR THE VERY BASICS LIKE FOOD, HOUSING, MEDICAL CARE, AND HEATING?: NOT HARD AT ALL

## 2025-01-02 SDOH — ECONOMIC STABILITY: FOOD INSECURITY: WITHIN THE PAST 12 MONTHS, YOU WORRIED THAT YOUR FOOD WOULD RUN OUT BEFORE YOU GOT MONEY TO BUY MORE.: NEVER TRUE

## 2025-01-02 SDOH — ECONOMIC STABILITY: FOOD INSECURITY: WITHIN THE PAST 12 MONTHS, THE FOOD YOU BOUGHT JUST DIDN'T LAST AND YOU DIDN'T HAVE MONEY TO GET MORE.: NEVER TRUE

## 2025-01-02 ASSESSMENT — PATIENT HEALTH QUESTIONNAIRE - PHQ9
SUM OF ALL RESPONSES TO PHQ QUESTIONS 1-9: 0
1. LITTLE INTEREST OR PLEASURE IN DOING THINGS: NOT AT ALL
SUM OF ALL RESPONSES TO PHQ9 QUESTIONS 1 & 2: 0
SUM OF ALL RESPONSES TO PHQ QUESTIONS 1-9: 0
SUM OF ALL RESPONSES TO PHQ QUESTIONS 1-9: 0
2. FEELING DOWN, DEPRESSED OR HOPELESS: NOT AT ALL
SUM OF ALL RESPONSES TO PHQ QUESTIONS 1-9: 0

## 2025-01-02 NOTE — PROGRESS NOTES
Carolyn Urias presents today for   Chief Complaint   Patient presents with    Annual Exam       \"Have you been to the ER, urgent care clinic since your last visit?  Hospitalized since your last visit?\"    NO    “Have you seen or consulted any other health care providers outside of [facility] since your last visit?”    NO       Have you had a mammogram?”   Stated having one in [place] in 2024    Date of last Mammogram: 3/10/2023          
(FIB- 4 score 0.92).  Completed follow-up with Dr. Pineda on 7/17/2024 and repeat PFT testing demonstrated stable lung function and diffusion capacity without change.  Follow-up recommended in 2 years (due 7/2026).  Advised  to continue with annual monitoring of CBC and hepatic function panel with calculation of FIB-4 score. If FIB-4 score >1.45, would require FibroScan testing every 2 years.  Lab testing today stable with calculated FIB-4 score 0.89.  Encouraged to follow a healthy Mediterranean diet, avoid alcohol and smoking, and stressed importance of weight loss.  4. History of primary hyperparathyroidism s/p subtotal parathyroidectomy.  Presented with nephrolithiasis with multiple calcium-based renal stones and noted mild hypercalcemia.  Nuclear medicine parathyroid scan (2/2019) showed focus of radiotracer accumulation posterior to the mid left thyroid lobe suspicious for small parathyroid adenoma.  Underwent neck exploration and excision of left superior parathyroid adenoma on 4/11/2019.  Pathology showed hypercellular parathyroid tissue.  No further difficulty with hypercalcemia or nephrolithiasis since surgery.  Identified to have osteopenia but bone density study and history of multiple fractures.  Reported treated with Boniva for approximately 12 months, but discontinued by Dr. Gillis given premenopausal status and questionable indication. Continuing to follow annually with Dr. Gillis with last visit on 8/29/2024.  5. Left thyroid nodule.  Identified on parathyroid scan in 2/2019 and follow-up ultrasounds have shown its size to decrease.  Established care with Dr. Gillis on 8/2023 and ultrasound showed interval increase in size of the thyroid nodule but felt to be TI-RADS 3 with negative Afirma.  Completed follow-up ultrasound on 8/29/2024 which showed nodule to be overall stable.  Continuing to follow annually with Dr. Gillis.  6. Osteopenia.  Patient with multiple fractures and reports bone density study

## 2025-01-02 NOTE — TELEPHONE ENCOUNTER
Please request a copy of patient's mammogram from her gynecologist Dr. Rodriguez which was performed on 3/7/2024.    Dr. Parris Rodriguez  [facility] [address]   [place] [address] [phone]

## 2025-01-03 PROBLEM — K21.9 GERD (GASTROESOPHAGEAL REFLUX DISEASE): Status: ACTIVE | Noted: 2025-01-03

## 2025-01-03 PROBLEM — Z87.442 HISTORY OF NEPHROLITHIASIS: Status: ACTIVE | Noted: 2023-06-26

## 2025-01-03 PROBLEM — M19.90 OSTEOARTHRITIS: Status: ACTIVE | Noted: 2025-01-03

## 2025-01-03 PROBLEM — E78.5 HYPERLIPIDEMIA: Status: ACTIVE | Noted: 2025-01-03

## 2025-01-03 PROBLEM — M35.7 BENIGN JOINT HYPERMOBILITY SYNDROME: Status: ACTIVE | Noted: 2025-01-03

## 2025-01-03 PROBLEM — R74.01 ELEVATED LIVER TRANSAMINASE LEVEL: Status: RESOLVED | Noted: 2023-06-26 | Resolved: 2025-01-03

## 2025-01-06 LAB — DRUGS UR: NORMAL

## 2025-01-30 DIAGNOSIS — F90.9 ATTENTION DEFICIT HYPERACTIVITY DISORDER (ADHD), UNSPECIFIED ADHD TYPE: ICD-10-CM

## 2025-01-30 RX ORDER — METHYLPHENIDATE HYDROCHLORIDE 36 MG/1
36 TABLET, EXTENDED RELEASE ORAL DAILY
Qty: 30 TABLET | Refills: 0 | Status: SHIPPED | OUTPATIENT
Start: 2025-01-30 | End: 2025-03-01

## 2025-01-30 RX ORDER — FEXOFENADINE HCL 180 MG/1
180 TABLET ORAL DAILY
Qty: 90 TABLET | Refills: 3 | Status: SHIPPED | OUTPATIENT
Start: 2025-01-30

## 2025-01-30 NOTE — TELEPHONE ENCOUNTER
PCP: Damari Arnold MD    LAST OFFICE VISIT:01/02/2025    UDS 01/02/2025  CSA: 01/02/2025       [facility]  report [facility]    The last fill date was 12/19/2024 for a 30 d/s qty 30      LAST REFILL PER CHART:  Medication:methylphenidate 36 MG CR tablet   Ordered On:12/19/2024  Instructions:Take 1 tablet by mouth daily for 30 days. Max Daily Amount: 36 mg   Dispense:30 tablets  Refills:0      Future Appointments   Date Time Provider Department Center   2/12/2025  8:45 AM Peter Julio DO [facility] BS AMB   10/9/2025  8:20 AM Olamide Ellis DO [facility] BS AMB   12/23/2025  8:30 AM [facility] LAB VISIT [facility] ECC DEP   1/6/2026  8:00 AM [facility]

## 2025-01-30 NOTE — TELEPHONE ENCOUNTER
PCP: Damari Arnold MD    LAST OFFICE VISIT: 01/02/2025    LAST REFILL PER CHART:  Medication:fexofenadine (ALLEGRA) 180 MG tablet   Ordered On:10/23/2024  Instructions:Take 1 tablet by mouth daily   Dispense:90 tablets  Refills:3      Future Appointments   Date Time Provider Department Center   2/12/2025  8:45 AM Peter Julio DO [facility] BS AMB   10/9/2025  8:20 AM Olamide Ellis DO [facility] BS AMB   12/23/2025  8:30 AM [facility] LAB VISIT [facility] ECC DEP   1/6/2026  8:00 AM Damari Arnold MD [facility] DEP

## 2025-02-12 ENCOUNTER — OFFICE VISIT (OUTPATIENT)
Age: 55
End: 2025-02-12

## 2025-02-12 VITALS — HEIGHT: 65 IN | WEIGHT: 206 LBS | BODY MASS INDEX: 34.32 KG/M2

## 2025-02-12 DIAGNOSIS — Z98.890 S/P TRIGGER FINGER RELEASE: ICD-10-CM

## 2025-02-12 DIAGNOSIS — M65.341 TRIGGER RING FINGER OF RIGHT HAND: ICD-10-CM

## 2025-02-12 DIAGNOSIS — M19.032 PRIMARY OSTEOARTHRITIS OF LEFT WRIST: Primary | ICD-10-CM

## 2025-02-12 RX ORDER — LIDOCAINE HYDROCHLORIDE 10 MG/ML
1 INJECTION, SOLUTION INFILTRATION; PERINEURAL ONCE
Status: COMPLETED | OUTPATIENT
Start: 2025-02-12 | End: 2025-02-12

## 2025-02-12 RX ADMIN — LIDOCAINE HYDROCHLORIDE 1 ML: 10 INJECTION, SOLUTION INFILTRATION; PERINEURAL at 09:26

## 2025-02-12 NOTE — PROGRESS NOTES
Carolyn Urias is a 54 y.o. female right handed .  Worker's Compensation and legal considerations: none    Chief Complaint   Patient presents with    Follow-up     Right ring      Pain Score:   0 - No pain    2/12/2025 HPI: Patient presents today due to persistent inability to fully straighten the ring finger status post right ring trigger finger release.  Specifically at the right ring finger she is noticing a popping at one of her joints when trying to fully extend the digit.  She also reports left-sided wrist pain.  Of note again she has a history of bilateral wrist open reduction internal fixations.  At her last visit her right wrist joint injection significantly improved her symptoms.    12/18/2024 HPI: Patient presents today with concern of scar tissue and stiffness about her right ring finger s/p right ring trigger finger release on 11/5/2024. She is also having persistent right wrist pain and presents today for steroid injection which we discussed at her last OV.    11/20/2024 HPI: Patient presents today with a concern of right wrist pain which she reports to be worse with extension and direct pressure such as when she's working out trying to do push ups or planks. She reports flares of this over the last 6 or so months and has been utilizing a brace as needed during flares. She reports history of right distal radius ORIF in ~2011.     She is also here for a postoperative visit now approximately 2 weeks s/p right ring trigger finger release on 11/5/2024 with Dr. Julio and denies any further locking or triggering of her ring finger. She did have post-op wound dehiscence and was treated with antibiotics and wound care. She does continue to have right little finger popping which has been present her whole life.     *Intra-op findings:  thickened RF A1 pulley with locking versus hypermobility of little finger during intra-operative testing.     10/30/2024 HPI: Patient presents today for

## 2025-02-14 ENCOUNTER — PATIENT MESSAGE (OUTPATIENT)
Facility: CLINIC | Age: 55
End: 2025-02-14

## 2025-02-14 DIAGNOSIS — F90.9 ATTENTION DEFICIT HYPERACTIVITY DISORDER (ADHD), UNSPECIFIED ADHD TYPE: Primary | ICD-10-CM

## 2025-02-14 RX ORDER — METHYLPHENIDATE HYDROCHLORIDE 36 MG/1
36 TABLET ORAL DAILY
Qty: 30 TABLET | Refills: 0 | Status: SHIPPED | OUTPATIENT
Start: 2025-02-14 | End: 2025-03-16

## 2025-02-14 NOTE — TELEPHONE ENCOUNTER
Reviewed report generated by the [place] [employer] Monitoring Program. Does not demonstrate aberrancies or inconsistencies with regard to the prescribing of controlled medications to this patient by other providers.    Last filled 12/19/2024 per .     UDS 01/02/2025   CSA: 01/02/2025     Will send in prescription for Concerta 36 mg daily given availability issues.

## 2025-02-14 NOTE — TELEPHONE ENCOUNTER
Before switching her medication, please ask that she see if another pharmacy has it available and we can send the prescription there.

## 2025-03-11 ENCOUNTER — PATIENT MESSAGE (OUTPATIENT)
Facility: CLINIC | Age: 55
End: 2025-03-11

## 2025-04-02 RX ORDER — EFLORNITHINE HYDROCHLORIDE 139 MG/G
CREAM TOPICAL
Qty: 45 G | Refills: 0 | Status: SHIPPED | OUTPATIENT
Start: 2025-04-02

## 2025-04-02 NOTE — TELEPHONE ENCOUNTER
PCP: Damari Arnold MD    LAST OFFICE VISIT: 01/02/2025    LAST REFILL PER CHART:  Medication:Eflornithine HCl (VANIQA) 13.9 % CREA   Ordered On:07/11/2023  Instructions:Apply to affected area twice daily at least 8 hours apart   Dispense:45g  Refills:3      Future Appointments   Date Time Provider Department Center   5/14/2025  8:45 AM Peter Julio DO [facility] BS AMB   10/9/2025  8:20 AM Olamide Ellis DO [facility] BS AMB   12/23/2025  8:30 AM [facility] LAB VISIT [facility] ECC DEP   1/6/2026  8:00 AM Damari Arnold MD [facility] DEP

## 2025-04-02 NOTE — TELEPHONE ENCOUNTER
PCP: Damari Arnold MD    Refill Request     LAST OFFICE VISIT: 9/12/24      Medication:   methylphenidate (CONCERTA) 36 MG extended release tablet   Take 1 tablet by mouth daily for 30 days. Max Daily Amount: 36 mg   Dispense: 30 tablet      Pharmacy [facility] [address] [place] [id number] [phone]       Future Appointments   Date Time Provider Department Center   5/14/2025  8:45 AM Peter Julio DO [facility] BS AMB   10/9/2025  8:20 AM Olamide Ellis DO [facility] BS AMB   12/23/2025  8:30 AM [facility] LAB VISIT [facility] ECC DEP   1/6/2026  8:00 AM Damari Arnold MD [facility] ECC DEP

## 2025-04-07 DIAGNOSIS — Z98.890 S/P TRIGGER FINGER RELEASE: ICD-10-CM

## 2025-04-07 DIAGNOSIS — M35.7 BENIGN JOINT HYPERMOBILITY SYNDROME: Primary | ICD-10-CM

## 2025-04-07 DIAGNOSIS — M65.341 TRIGGER RING FINGER OF RIGHT HAND: ICD-10-CM

## 2025-04-08 ENCOUNTER — PATIENT MESSAGE (OUTPATIENT)
Facility: CLINIC | Age: 55
End: 2025-04-08

## 2025-04-08 DIAGNOSIS — F90.9 ATTENTION DEFICIT HYPERACTIVITY DISORDER (ADHD), UNSPECIFIED ADHD TYPE: ICD-10-CM

## 2025-04-09 RX ORDER — METHYLPHENIDATE HYDROCHLORIDE 36 MG/1
36 TABLET ORAL DAILY
Qty: 30 TABLET | Refills: 0 | Status: SHIPPED | OUTPATIENT
Start: 2025-04-09 | End: 2025-05-09

## 2025-04-09 NOTE — TELEPHONE ENCOUNTER
PCP: Damari Arnold MD    LAST OFFICE VISIT: 01/02/2025    LAST REFILL PER CHART:  Medication:methylphenidate (CONCERTA) 36 MG extended release tablet    Ordered On:02/14/2025  Instructions:Take 1 tablet by mouth daily for 30 days. Max Daily Amount: 36 mg   Dispense:30 tablets  Refills:0    Future Appointments   Date Time Provider Department Center   5/14/2025  8:45 AM Peter Julio DO [facility] BS AMB   10/9/2025  8:20 AM Olamide Ellis DO [facility] BS AMB   12/23/2025  8:30 AM [facility] LAB VISIT [facility] DEP   1/6/2026  8:00 AM Damari Arnold MD [facility] DEP

## 2025-04-21 DIAGNOSIS — F90.9 ATTENTION DEFICIT HYPERACTIVITY DISORDER (ADHD), UNSPECIFIED ADHD TYPE: ICD-10-CM

## 2025-04-21 RX ORDER — EFLORNITHINE HYDROCHLORIDE 139 MG/G
CREAM TOPICAL
Qty: 45 G | Refills: 3 | Status: SHIPPED | OUTPATIENT
Start: 2025-04-21

## 2025-04-21 RX ORDER — FEXOFENADINE HCL 180 MG/1
180 TABLET ORAL DAILY
Qty: 90 TABLET | Refills: 3 | Status: CANCELLED | OUTPATIENT
Start: 2025-04-21

## 2025-04-21 RX ORDER — METHYLPHENIDATE HYDROCHLORIDE 36 MG/1
36 TABLET ORAL DAILY
Qty: 30 TABLET | Refills: 0 | Status: CANCELLED | OUTPATIENT
Start: 2025-04-21 | End: 2025-05-21

## 2025-04-21 NOTE — TELEPHONE ENCOUNTER
[facility] reviewed 4/21/2025    The last fill date for Methylphenidate Er 36 Mg Tab  was 2/17/2025 for a 30 d/s qty 30    Vaniqa Cream last filled 4/2/2025   Fexofenadine last filled 1/30/2025      Last UDS: completed     CSA Last signed: 1/2/2025         PCP: Damari Arnold MD    Last appt:  1/2/2025  Future Appointments   Date Time Provider Department Center   5/14/2025  8:45 AM [facility]   10/9/2025  8:20 AM Olamide Ellis DO [facility] AMB   12/23/2025  8:30 AM [facility] LAB VISIT [facility] DEP   1/6/2026  8:00 AM Damari Arnold MD [facility] DEP       Requested Prescriptions     Pending Prescriptions Disp Refills    Eflornithine HCl (VANIQA) 13.9 % CREA 45 g 0     Sig: Apply to affected area twice daily at least 8 hours apart.    fexofenadine (ALLEGRA) 180 MG tablet 90 tablet 3     Sig: Take 1 tablet by mouth daily    methylphenidate (CONCERTA) 36 MG extended release tablet 30 tablet 0     Sig: Take 1 tablet by mouth daily for 30 days. Max Daily Amount: 36 mg

## 2025-04-22 RX ORDER — METHYLPHENIDATE HYDROCHLORIDE 36 MG/1
36 TABLET ORAL DAILY
Qty: 30 TABLET | Refills: 0 | OUTPATIENT
Start: 2025-04-22 | End: 2025-05-22

## 2025-04-22 NOTE — TELEPHONE ENCOUNTER
Called patient, gave information. She will call [facility] to see if they received 04/09/2025 script.

## 2025-04-22 NOTE — TELEPHONE ENCOUNTER
Please advise that the prescription for methylphenidate was sent to [facility] on [facility] on 4/9/2025.  Please find out if she picked this up and if not available, where she would like the prescription sent.    Last fill date for methylphenidate on  2/17/2025.    Please also inform her that a year supply of Allegra was sent to mail-order on 1/30/2025 so she should have refills available.

## 2025-05-14 ENCOUNTER — OFFICE VISIT (OUTPATIENT)
Age: 55
End: 2025-05-14
Payer: COMMERCIAL

## 2025-05-14 VITALS — HEIGHT: 65 IN | WEIGHT: 206 LBS | BODY MASS INDEX: 34.32 KG/M2

## 2025-05-14 DIAGNOSIS — M65.341 TRIGGER RING FINGER OF RIGHT HAND: Primary | ICD-10-CM

## 2025-05-14 DIAGNOSIS — M19.032 PRIMARY OSTEOARTHRITIS OF LEFT WRIST: ICD-10-CM

## 2025-05-14 DIAGNOSIS — Z98.890 S/P TRIGGER FINGER RELEASE: ICD-10-CM

## 2025-05-14 PROCEDURE — 99213 OFFICE O/P EST LOW 20 MIN: CPT | Performed by: ORTHOPAEDIC SURGERY

## 2025-05-14 RX ORDER — SPIRONOLACTONE 50 MG/1
25 TABLET, FILM COATED ORAL DAILY
COMMUNITY
Start: 2025-04-17

## 2025-05-14 NOTE — PROGRESS NOTES
Carolyn Urias is a 54 y.o. female right handed .  Worker's Compensation and legal considerations: none    Chief Complaint   Patient presents with    Follow-up     Right ring and left wrist      Pain Score:   0 - No pain    5/14/2025 HPI: Patient presents today for follow-up.  At her last visit she received a right ring trigger finger injection status post release as well as a left wrist joint injection.  She has also been in therapy and reports improvement in her range of motion and no pain today.    2/12/2025 HPI: Patient presents today due to persistent inability to fully straighten the ring finger status post right ring trigger finger release.  Specifically at the right ring finger she is noticing a popping at one of her joints when trying to fully extend the digit.  She also reports left-sided wrist pain.  Of note again she has a history of bilateral wrist open reduction internal fixations.  At her last visit her right wrist joint injection significantly improved her symptoms.    12/18/2024 HPI: Patient presents today with concern of scar tissue and stiffness about her right ring finger s/p right ring trigger finger release on 11/5/2024. She is also having persistent right wrist pain and presents today for steroid injection which we discussed at her last OV.    11/20/2024 HPI: Patient presents today with a concern of right wrist pain which she reports to be worse with extension and direct pressure such as when she's working out trying to do push ups or planks. She reports flares of this over the last 6 or so months and has been utilizing a brace as needed during flares. She reports history of right distal radius ORIF in ~2011.     She is also here for a postoperative visit now approximately 2 weeks s/p right ring trigger finger release on 11/5/2024 with Dr. Julio and denies any further locking or triggering of her ring finger. She did have post-op wound dehiscence and was treated with

## 2025-05-23 DIAGNOSIS — F90.9 ATTENTION DEFICIT HYPERACTIVITY DISORDER (ADHD), UNSPECIFIED ADHD TYPE: ICD-10-CM

## 2025-05-23 RX ORDER — METHYLPHENIDATE HYDROCHLORIDE 36 MG/1
36 TABLET ORAL DAILY
Qty: 30 TABLET | Refills: 0 | Status: SHIPPED | OUTPATIENT
Start: 2025-05-23 | End: 2025-06-22

## 2025-05-23 NOTE — TELEPHONE ENCOUNTER
[facility]  report reviewed 5/23/2025    The last fill date for Methylphenidate Er 36 Mg Tab  was 4/24/2025 for a 30 d/s qty 30      Last UDS: completed     CSA Last signed: 1/2/2025         PCP: Damari Arnold MD    Last appt:  1/2/2025  Future Appointments   Date Time Provider Department Center   10/9/2025  8:20 AM Olamide Ellis DO [facility]   12/23/2025  8:30 AM [facility] LAB VISIT [facility] DEP   1/6/2026  8:00 AM Damari Arnold MD [facility] DEP       Requested Prescriptions     Pending Prescriptions Disp Refills    methylphenidate (CONCERTA) 36 MG extended release tablet 30 tablet 0     Sig: Take 1 tablet by mouth daily for 30 days. Max Daily Amount: 36 mg

## 2025-07-18 DIAGNOSIS — F90.9 ATTENTION DEFICIT HYPERACTIVITY DISORDER (ADHD), UNSPECIFIED ADHD TYPE: ICD-10-CM

## 2025-07-18 RX ORDER — METHYLPHENIDATE HYDROCHLORIDE 36 MG/1
36 TABLET ORAL DAILY
Qty: 30 TABLET | Refills: 0 | Status: SHIPPED | OUTPATIENT
Start: 2025-07-18 | End: 2025-08-17

## 2025-07-18 NOTE — TELEPHONE ENCOUNTER
[facility]  report reviewed 7/18/2025    The last fill date for Methylphenidate Er 36 Mg Tab  was 5/23/2025 for a 30 d/s qty 30      Last UDS: completed     CSA Last signed: 1/2/2025         PCP: Damari Arnold MD    Last appt: 1/2/2025  Future Appointments   Date Time Provider Department Center   10/9/2025  8:20 AM Olamide Ellis DO [facility]   12/23/2025  8:30 AM [facility] LAB VISIT [facility] DEP   1/6/2026  8:00 AM Damari Arnold MD [facility] DEP       Requested Prescriptions     Pending Prescriptions Disp Refills    methylphenidate (CONCERTA) 36 MG extended release tablet 30 tablet 0     Sig: Take 1 tablet by mouth daily for 30 days. Max Daily Amount: 36 mg

## 2025-09-03 DIAGNOSIS — F90.9 ATTENTION DEFICIT HYPERACTIVITY DISORDER (ADHD), UNSPECIFIED ADHD TYPE: ICD-10-CM

## 2025-09-03 RX ORDER — METHYLPHENIDATE HYDROCHLORIDE 36 MG/1
36 TABLET ORAL DAILY
Qty: 30 TABLET | Refills: 0 | Status: SHIPPED | OUTPATIENT
Start: 2025-09-03 | End: 2025-10-03

## (undated) DEVICE — STERILE POLYISOPRENE POWDER-FREE SURGICAL GLOVES: Brand: PROTEXIS

## (undated) DEVICE — ZIMMER® STERILE DISPOSABLE TOURNIQUET CUFF WITH PROTECTIVE SLEEVE AND PLC, DUAL PORT, SINGLE BLADDER, 18 IN. (46 CM)

## (undated) DEVICE — SOLUTION IRRIG 1000ML 0.9% SOD CHL USP POUR PLAS BTL

## (undated) DEVICE — GLOVE SURG SZ 8 CRM LTX FREE POLYISOPRENE POLYMER BEAD ANTI

## (undated) DEVICE — BLADE OPHTH GRN ROUNDED TIP 1 SIDE SHRP GRINDLESS MINI-BLDE

## (undated) DEVICE — CANNULA PERF L2IN BLNT TIP 2MM VES CLR RADPQ BODY FEM LUER

## (undated) DEVICE — CORD ES L12FT BPLR FRCP

## (undated) DEVICE — SYRINGE MED 30ML STD CLR PLAS LUERLOCK TIP N CTRL DISP

## (undated) DEVICE — BANDAGE COMPR W1INXL5YD WHT COT E TAPE RUB BASE ADH CURAD

## (undated) DEVICE — Device

## (undated) DEVICE — BANDAGE,ELASTIC,ESMARK,STERILE,4"X9',LF: Brand: MEDLINE

## (undated) DEVICE — GOWN,SIRUS,FABRNF,2XL,18/CS: Brand: MEDLINE

## (undated) DEVICE — DRAPE,HAND,STERILE: Brand: MEDLINE

## (undated) DEVICE — GAUZE,SPONGE,4"X4",12PLY,STERILE,LF,2'S: Brand: MEDLINE

## (undated) DEVICE — APPLICATOR MEDICATED 26 CC SOLUTION HI LT ORNG CHLORAPREP

## (undated) DEVICE — Z DISCONTINUED NO SUB SUTURE CHROMIC GUT SZ 4-0 L18IN ABSRB BRN L19MM PS-2 3/8 1637G